# Patient Record
Sex: FEMALE | Race: WHITE | NOT HISPANIC OR LATINO | Employment: OTHER | URBAN - METROPOLITAN AREA
[De-identification: names, ages, dates, MRNs, and addresses within clinical notes are randomized per-mention and may not be internally consistent; named-entity substitution may affect disease eponyms.]

---

## 2023-07-07 LAB
ANION GAP IN SER/PLAS: 11 MMOL/L (ref 10–20)
CALCIUM (MG/DL) IN SER/PLAS: 9.3 MG/DL (ref 8.6–10.3)
CARBON DIOXIDE, TOTAL (MMOL/L) IN SER/PLAS: 27 MMOL/L (ref 21–32)
CHLORIDE (MMOL/L) IN SER/PLAS: 106 MMOL/L (ref 98–107)
CREATININE (MG/DL) IN SER/PLAS: 0.79 MG/DL (ref 0.5–1.05)
ERYTHROCYTE DISTRIBUTION WIDTH (RATIO) BY AUTOMATED COUNT: 14.1 % (ref 11.5–14.5)
ERYTHROCYTE MEAN CORPUSCULAR HEMOGLOBIN CONCENTRATION (G/DL) BY AUTOMATED: 31.5 G/DL (ref 32–36)
ERYTHROCYTE MEAN CORPUSCULAR VOLUME (FL) BY AUTOMATED COUNT: 94 FL (ref 80–100)
ERYTHROCYTES (10*6/UL) IN BLOOD BY AUTOMATED COUNT: 4.49 X10E12/L (ref 4–5.2)
GFR FEMALE: 78 ML/MIN/1.73M2
GLUCOSE (MG/DL) IN SER/PLAS: 101 MG/DL (ref 74–99)
HEMATOCRIT (%) IN BLOOD BY AUTOMATED COUNT: 42.2 % (ref 36–46)
HEMOGLOBIN (G/DL) IN BLOOD: 13.3 G/DL (ref 12–16)
INR IN PPP BY COAGULATION ASSAY: 1 (ref 0.9–1.1)
LEUKOCYTES (10*3/UL) IN BLOOD BY AUTOMATED COUNT: 5 X10E9/L (ref 4.4–11.3)
NRBC (PER 100 WBCS) BY AUTOMATED COUNT: 0 /100 WBC (ref 0–0)
PLATELETS (10*3/UL) IN BLOOD AUTOMATED COUNT: 305 X10E9/L (ref 150–450)
POTASSIUM (MMOL/L) IN SER/PLAS: 4 MMOL/L (ref 3.5–5.3)
PROTHROMBIN TIME (PT) IN PPP BY COAGULATION ASSAY: 11.4 SEC (ref 9.8–12.8)
SODIUM (MMOL/L) IN SER/PLAS: 140 MMOL/L (ref 136–145)
UREA NITROGEN (MG/DL) IN SER/PLAS: 14 MG/DL (ref 6–23)

## 2023-07-25 ENCOUNTER — HOSPITAL ENCOUNTER (OUTPATIENT)
Dept: DATA CONVERSION | Facility: HOSPITAL | Age: 78
End: 2023-07-25
Attending: NEUROLOGICAL SURGERY | Admitting: NEUROLOGICAL SURGERY
Payer: MEDICARE

## 2023-07-25 DIAGNOSIS — I65.22 OCCLUSION AND STENOSIS OF LEFT CAROTID ARTERY: ICD-10-CM

## 2023-07-25 DIAGNOSIS — I67.1 CEREBRAL ANEURYSM, NONRUPTURED (HHS-HCC): ICD-10-CM

## 2023-08-11 LAB
ANION GAP IN SER/PLAS: 10 MMOL/L (ref 10–20)
CALCIUM (MG/DL) IN SER/PLAS: 9.4 MG/DL (ref 8.6–10.3)
CARBON DIOXIDE, TOTAL (MMOL/L) IN SER/PLAS: 29 MMOL/L (ref 21–32)
CHLORIDE (MMOL/L) IN SER/PLAS: 106 MMOL/L (ref 98–107)
CHOLESTEROL (MG/DL) IN SER/PLAS: 169 MG/DL (ref 0–199)
CHOLESTEROL IN HDL (MG/DL) IN SER/PLAS: 75.7 MG/DL
CHOLESTEROL/HDL RATIO: 2.2
CREATININE (MG/DL) IN SER/PLAS: 0.83 MG/DL (ref 0.5–1.05)
ERYTHROCYTE DISTRIBUTION WIDTH (RATIO) BY AUTOMATED COUNT: 14.3 % (ref 11.5–14.5)
ERYTHROCYTE MEAN CORPUSCULAR HEMOGLOBIN CONCENTRATION (G/DL) BY AUTOMATED: 31.5 G/DL (ref 32–36)
ERYTHROCYTE MEAN CORPUSCULAR VOLUME (FL) BY AUTOMATED COUNT: 93 FL (ref 80–100)
ERYTHROCYTES (10*6/UL) IN BLOOD BY AUTOMATED COUNT: 4.52 X10E12/L (ref 4–5.2)
GFR FEMALE: 73 ML/MIN/1.73M2
GLUCOSE (MG/DL) IN SER/PLAS: 93 MG/DL (ref 74–99)
HEMATOCRIT (%) IN BLOOD BY AUTOMATED COUNT: 41.9 % (ref 36–46)
HEMOGLOBIN (G/DL) IN BLOOD: 13.2 G/DL (ref 12–16)
LDL: 76 MG/DL (ref 0–99)
LEUKOCYTES (10*3/UL) IN BLOOD BY AUTOMATED COUNT: 5.1 X10E9/L (ref 4.4–11.3)
NRBC (PER 100 WBCS) BY AUTOMATED COUNT: 0 /100 WBC (ref 0–0)
PLATELETS (10*3/UL) IN BLOOD AUTOMATED COUNT: 374 X10E9/L (ref 150–450)
POTASSIUM (MMOL/L) IN SER/PLAS: 4.1 MMOL/L (ref 3.5–5.3)
SODIUM (MMOL/L) IN SER/PLAS: 141 MMOL/L (ref 136–145)
THYROTROPIN (MIU/L) IN SER/PLAS BY DETECTION LIMIT <= 0.05 MIU/L: 2.12 MIU/L (ref 0.44–3.98)
TRIGLYCERIDE (MG/DL) IN SER/PLAS: 86 MG/DL (ref 0–149)
UREA NITROGEN (MG/DL) IN SER/PLAS: 15 MG/DL (ref 6–23)
VLDL: 17 MG/DL (ref 0–40)

## 2023-09-29 VITALS — WEIGHT: 146.16 LBS | HEIGHT: 62 IN | BODY MASS INDEX: 26.9 KG/M2

## 2023-10-03 DIAGNOSIS — Z12.11 COLON CANCER SCREENING: ICD-10-CM

## 2023-10-05 RX ORDER — LACTULOSE 10 G/15ML
90 SOLUTION ORAL SEE ADMIN INSTRUCTIONS
Qty: 180 ML | Refills: 0 | Status: SHIPPED | OUTPATIENT
Start: 2023-10-05 | End: 2023-10-09 | Stop reason: WASHOUT

## 2023-10-08 PROBLEM — N95.2 ATROPHIC VAGINITIS: Status: ACTIVE | Noted: 2023-10-08
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
## 2023-10-08 PROBLEM — K62.5 RECTAL BLEEDING: Status: ACTIVE | Noted: 2023-10-08

## 2023-10-08 PROBLEM — I67.1 CEREBRAL ANEURYSM (HHS-HCC): Status: ACTIVE | Noted: 2023-10-08

## 2023-10-08 PROBLEM — R52 PAIN: Status: ACTIVE | Noted: 2023-10-08

## 2023-10-08 PROBLEM — K29.80 DUODENITIS: Status: ACTIVE | Noted: 2023-10-08

## 2023-10-08 PROBLEM — N76.4 LABIAL ABSCESS: Status: ACTIVE | Noted: 2023-10-08

## 2023-10-08 PROBLEM — K57.90 DIVERTICULOSIS: Status: ACTIVE | Noted: 2023-10-08

## 2023-10-08 PROBLEM — M54.10 RADICULOPATHY: Status: ACTIVE | Noted: 2023-10-08

## 2023-10-08 PROBLEM — R19.00 PULSATILE ABDOMINAL MASS: Status: ACTIVE | Noted: 2023-10-08

## 2023-10-08 PROBLEM — M85.80 OSTEOPENIA: Status: ACTIVE | Noted: 2023-10-08

## 2023-10-08 PROBLEM — D50.9 HYPOCHROMIC MICROCYTIC ANEMIA: Status: ACTIVE | Noted: 2023-10-08

## 2023-10-08 PROBLEM — S86.911A: Status: ACTIVE | Noted: 2023-10-08

## 2023-10-08 PROBLEM — E07.9 THYROID DISORDER: Status: ACTIVE | Noted: 2023-10-08

## 2023-10-08 PROBLEM — I72.9 ANEURYSM (CMS-HCC): Status: ACTIVE | Noted: 2023-10-08

## 2023-10-08 RX ORDER — LEVOTHYROXINE SODIUM 88 UG/1
88 TABLET ORAL
COMMUNITY

## 2023-10-08 RX ORDER — CHLORHEXIDINE GLUCONATE ORAL RINSE 1.2 MG/ML
15 SOLUTION DENTAL 2 TIMES DAILY
COMMUNITY
Start: 2023-01-12 | End: 2023-10-09 | Stop reason: WASHOUT

## 2023-10-08 RX ORDER — CLOPIDOGREL BISULFATE 75 MG/1
75 TABLET ORAL DAILY
COMMUNITY
Start: 2023-07-06 | End: 2023-10-09 | Stop reason: WASHOUT

## 2023-10-08 RX ORDER — ACETAMINOPHEN 500 MG
1000 TABLET ORAL AS NEEDED
COMMUNITY
Start: 2021-05-25 | End: 2023-10-09 | Stop reason: WASHOUT

## 2023-10-08 RX ORDER — CEFADROXIL 500 MG/1
1 CAPSULE ORAL 2 TIMES DAILY
COMMUNITY
Start: 2021-09-25 | End: 2023-10-09 | Stop reason: WASHOUT

## 2023-10-08 RX ORDER — MOXIFLOXACIN 5 MG/ML
1 SOLUTION/ DROPS OPHTHALMIC 4 TIMES DAILY
COMMUNITY
Start: 2022-11-16 | End: 2023-10-09 | Stop reason: WASHOUT

## 2023-10-08 RX ORDER — NAPROXEN SODIUM 220 MG/1
81 TABLET, FILM COATED ORAL DAILY
COMMUNITY
Start: 2023-07-06

## 2023-10-08 RX ORDER — PREDNISOLONE ACETATE 10 MG/ML
1 SUSPENSION/ DROPS OPHTHALMIC 4 TIMES DAILY
COMMUNITY
Start: 2022-11-16 | End: 2023-10-09 | Stop reason: WASHOUT

## 2023-10-08 RX ORDER — AMLODIPINE BESYLATE 5 MG/1
5 TABLET ORAL DAILY
COMMUNITY

## 2023-10-08 RX ORDER — ATORVASTATIN CALCIUM 20 MG/1
20 TABLET, FILM COATED ORAL DAILY
COMMUNITY

## 2023-10-10 ENCOUNTER — ANESTHESIA EVENT (OUTPATIENT)
Dept: GASTROENTEROLOGY | Facility: HOSPITAL | Age: 78
End: 2023-10-10
Payer: MEDICARE

## 2023-10-10 ENCOUNTER — HOSPITAL ENCOUNTER (OUTPATIENT)
Dept: GASTROENTEROLOGY | Facility: HOSPITAL | Age: 78
Setting detail: OUTPATIENT SURGERY
Discharge: HOME | End: 2023-10-10
Payer: MEDICARE

## 2023-10-10 ENCOUNTER — ANESTHESIA (OUTPATIENT)
Dept: GASTROENTEROLOGY | Facility: HOSPITAL | Age: 78
End: 2023-10-10
Payer: MEDICARE

## 2023-10-10 VITALS
WEIGHT: 146.16 LBS | DIASTOLIC BLOOD PRESSURE: 76 MMHG | TEMPERATURE: 98.6 F | OXYGEN SATURATION: 100 % | HEART RATE: 68 BPM | BODY MASS INDEX: 26.9 KG/M2 | SYSTOLIC BLOOD PRESSURE: 138 MMHG | HEIGHT: 62 IN | RESPIRATION RATE: 16 BRPM

## 2023-10-10 DIAGNOSIS — Z12.11 ENCOUNTER FOR SCREENING FOR MALIGNANT NEOPLASM OF COLON: Primary | ICD-10-CM

## 2023-10-10 PROCEDURE — 88305 TISSUE EXAM BY PATHOLOGIST: CPT | Mod: TC | Performed by: INTERNAL MEDICINE

## 2023-10-10 PROCEDURE — 7100000009 HC PHASE TWO TIME - INITIAL BASE CHARGE

## 2023-10-10 PROCEDURE — 3700000001 HC GENERAL ANESTHESIA TIME - INITIAL BASE CHARGE

## 2023-10-10 PROCEDURE — 88305 TISSUE EXAM BY PATHOLOGIST: CPT | Mod: TC,SUR | Performed by: INTERNAL MEDICINE

## 2023-10-10 PROCEDURE — 7100000010 HC PHASE TWO TIME - EACH INCREMENTAL 1 MINUTE

## 2023-10-10 PROCEDURE — 2500000004 HC RX 250 GENERAL PHARMACY W/ HCPCS (ALT 636 FOR OP/ED): Performed by: ANESTHESIOLOGIST ASSISTANT

## 2023-10-10 PROCEDURE — 3700000002 HC GENERAL ANESTHESIA TIME - EACH INCREMENTAL 1 MINUTE

## 2023-10-10 PROCEDURE — A45380 PR COLONOSCOPY,BIOPSY: Performed by: ANESTHESIOLOGIST ASSISTANT

## 2023-10-10 PROCEDURE — A45380 PR COLONOSCOPY,BIOPSY: Performed by: ANESTHESIOLOGY

## 2023-10-10 PROCEDURE — 2500000005 HC RX 250 GENERAL PHARMACY W/O HCPCS: Performed by: ANESTHESIOLOGIST ASSISTANT

## 2023-10-10 PROCEDURE — 99100 ANES PT EXTEME AGE<1 YR&>70: CPT | Performed by: ANESTHESIOLOGY

## 2023-10-10 PROCEDURE — 88305 TISSUE EXAM BY PATHOLOGIST: CPT | Performed by: PATHOLOGY

## 2023-10-10 PROCEDURE — 45380 COLONOSCOPY AND BIOPSY: CPT | Performed by: INTERNAL MEDICINE

## 2023-10-10 PROCEDURE — 2580000001 HC RX 258 IV SOLUTIONS: Performed by: ANESTHESIOLOGIST ASSISTANT

## 2023-10-10 RX ORDER — PROPOFOL 10 MG/ML
INJECTION, EMULSION INTRAVENOUS CONTINUOUS PRN
Status: DISCONTINUED | OUTPATIENT
Start: 2023-10-10 | End: 2023-10-10

## 2023-10-10 RX ORDER — SODIUM CHLORIDE, SODIUM LACTATE, POTASSIUM CHLORIDE, CALCIUM CHLORIDE 600; 310; 30; 20 MG/100ML; MG/100ML; MG/100ML; MG/100ML
20 INJECTION, SOLUTION INTRAVENOUS CONTINUOUS
Status: DISCONTINUED | OUTPATIENT
Start: 2023-10-10 | End: 2023-10-18

## 2023-10-10 RX ORDER — LIDOCAINE HYDROCHLORIDE 20 MG/ML
INJECTION, SOLUTION INFILTRATION; PERINEURAL AS NEEDED
Status: DISCONTINUED | OUTPATIENT
Start: 2023-10-10 | End: 2023-10-10

## 2023-10-10 RX ORDER — PROPOFOL 10 MG/ML
INJECTION, EMULSION INTRAVENOUS AS NEEDED
Status: DISCONTINUED | OUTPATIENT
Start: 2023-10-10 | End: 2023-10-10

## 2023-10-10 RX ADMIN — PROPOFOL 16 MG: 10 INJECTION, EMULSION INTRAVENOUS at 10:56

## 2023-10-10 RX ADMIN — PROPOFOL 50 MG: 10 INJECTION, EMULSION INTRAVENOUS at 10:27

## 2023-10-10 RX ADMIN — LIDOCAINE HYDROCHLORIDE 35 MG: 20 INJECTION, SOLUTION INFILTRATION; PERINEURAL at 10:27

## 2023-10-10 RX ADMIN — PROPOFOL 20 MG: 10 INJECTION, EMULSION INTRAVENOUS at 10:34

## 2023-10-10 RX ADMIN — PROPOFOL 100 MCG/KG/MIN: 10 INJECTION, EMULSION INTRAVENOUS at 10:27

## 2023-10-10 RX ADMIN — SODIUM CHLORIDE, POTASSIUM CHLORIDE, SODIUM LACTATE AND CALCIUM CHLORIDE: 600; 310; 30; 20 INJECTION, SOLUTION INTRAVENOUS at 10:23

## 2023-10-10 SDOH — HEALTH STABILITY: MENTAL HEALTH: CURRENT SMOKER: 0

## 2023-10-10 ASSESSMENT — PAIN SCALES - GENERAL
PAIN_LEVEL: 0
PAINLEVEL_OUTOF10: 0 - NO PAIN

## 2023-10-10 ASSESSMENT — PAIN - FUNCTIONAL ASSESSMENT: PAIN_FUNCTIONAL_ASSESSMENT: 0-10

## 2023-10-10 NOTE — DISCHARGE INSTRUCTIONS
Patient Instructions after a Colonoscopy      The anesthetics, sedatives or narcotics which were given to you today will be acting in your body for the next 24 hours, so you might feel a little sleepy or groggy.  This feeling should slowly wear off. Carefully read and follow the instructions.     You received sedation today:  - Do not drive or operate any machinery or power tools of any kind.   - No alcoholic beverages today, not even beer or wine.  - Do not make any important decisions or sign any legal documents.  - No over the counter medications that contain alcohol or that may cause drowsiness.  - Do not make any important decisions or sign any legal documents.    While it is common to experience mild to moderate abdominal distention, gas, or belching after your procedure, if any of these symptoms occur following discharge from the GI Lab or within one week of having your procedure, call the [facility] to be advised whether a visit to your nearest Urgent Care or Emergency Department is indicated.  Take this paper with you if you go.     - If you develop an allergic reaction to the medications that were given during your procedure such as difficulty breathing, rash, hives, severe nausea, vomiting or lightheadedness.  - If you experience chest pain, shortness of breath, severe abdominal pain, fevers and chills.  -If you develop signs and symptoms of bleeding such as blood in your spit, if your stools turn black, tarry, or bloody  - If you have not urinated within 8 hours following your procedure.  - If your IV site becomes painful, red, inflamed, or looks infected.    If you received a biopsy/polypectomy/sphincterotomy the following instructions apply below:    __ Do not use Aspirin containing products, non-steroidal medications or anti-coagulants for one week following your procedure. (Examples of these types of medications are: Advil, Arthrotec, Aleve, Coumadin, Ecotrin, Heparin, Ibuprofen,  Indocin, Motrin, Naprosyn, Nuprin, Plavix, Vioxx, and Voltarin, or their generic forms.  This list is not all-inclusive.  Check with your physician or pharmacist before resuming medications.)   __ Eat a soft diet today.  Avoid foods that are poorly digested for the next 24 hours.  These foods would include: nuts, beans, lettuce, red meats, and fried foods. Start with liquids and advance your diet as tolerated, gradually work up to eating solids.   __ Do not have a Barium Study or Enema for one week.    Your physician recommends the additional following instructions:    -You have a contact number available for emergencies. The signs and symptoms of potential delayed complications were discussed with you. You may return to normal activities tomorrow.  -Resume your previous diet.  -Continue your present medications.   -We are waiting for your pathology results.  -Your physician has recommended a repeat colonoscopy (date to be determined after pending pathology results are reviewed) for surveillance based on pathology results.  -The findings and recommendations have been discussed with you.  -The findings and recommendations were discussed with your family.  - Please see Medication Reconciliation Form for new medication/medications prescribed.       If you experience any problems or have any questions following discharge from the GI Lab, please call:        Nurse Signature                                                                        Date___________________                                                                            Patient/Responsible Party Signature                                        Date___________________

## 2023-10-10 NOTE — ANESTHESIA POSTPROCEDURE EVALUATION
Patient: Sherin Hancock    Procedure Summary       Date: 10/10/23 Room / Location: [facility]    Anesthesia Start: 1018 Anesthesia Stop: 1107    Procedure: COLONOSCOPY Diagnosis:       Encounter for screening for malignant neoplasm of colon      Encounter for screening for malignant neoplasm of colon    Scheduled Providers: Gonzalez Montilla MD Responsible Provider: Wm Crawford MD    Anesthesia Type: MAC ASA Status: 3            Anesthesia Type: MAC    Vitals Value Taken Time   /61 10/10/23 1111   Temp 37.0 10/10/23 1111   Pulse 65 10/10/23 1111   Resp 16 10/10/23 1111   SpO2 100 10/10/23 1111       Anesthesia Post Evaluation    Patient location during evaluation: PACU  Patient participation: complete - patient participated  Level of consciousness: awake  Pain score: 0  Pain management: adequate  Airway patency: patent  Cardiovascular status: acceptable  Respiratory status: acceptable  Hydration status: acceptable      No notable events documented.

## 2023-10-10 NOTE — ANESTHESIA PREPROCEDURE EVALUATION
Patient: Sherin Hancock    Procedure Information       Date/Time: 10/10/23 1000    Scheduled providers: Gonzalez Montilla MD    Procedure: COLONOSCOPY    Location: [facility]            Relevant Problems   Cardiovascular   (+) Cerebral aneurysm      GI   (+) Rectal bleeding      Neuro/Psych   (+) Cerebral aneurysm   (+) Radiculopathy      Hematology   (+) Hypochromic microcytic anemia       Clinical information reviewed:   Tobacco  Allergies  Meds   Med Hx  Surg Hx   Fam Hx          NPO Detail:  NPO/Void Status  Date of Last Liquid: 10/09/23  Time of Last Liquid: 2200  Date of Last Solid: 10/08/23  Time of Last Solid: 1800  Last Intake Type: Clear fluids         Physical Exam    Airway  Mallampati: II  TM distance: >3 FB  Neck ROM: full     Cardiovascular - normal exam  Rhythm: regular  Rate: normal     Dental    Pulmonary - normal exam     Abdominal            Anesthesia Plan    ASA 3     MAC     The patient is not a current smoker.    intravenous induction   Anesthetic plan and risks discussed with patient.  Use of blood products discussed with patient who.    Plan discussed with CAA.

## 2023-10-10 NOTE — H&P
"History Of Present Illness  Sherin Hancock is a 78 y.o. female presenting with rectal bleeding she also has history of cerebral aneurysm she has been cleared for the procedure by her neurosurgeon.  Pathology.     Past Medical History  Past Medical History:   Diagnosis Date    Encounter for screening for malignant neoplasm of vagina     Vaginal Pap smear    Hyperlipidemia     Hypertension     Hypothyroidism     Personal history of diseases of the skin and subcutaneous tissue     History of sebaceous cyst    Personal history of other diseases of the female genital tract     History of vaginal discharge    Personal history of other medical treatment     H/O mammogram    Personal history of other medical treatment     H/O bone density study       Surgical History  Past Surgical History:   Procedure Laterality Date    BREAST LUMPECTOMY  07/17/2015    Left Breast Lumpectomy    TUBAL LIGATION  07/17/2015    Tubal Ligation        Social History  She reports that she has never smoked. She has never used smokeless tobacco. She reports that she does not drink alcohol and does not use drugs.    Family History  Family History   Problem Relation Name Age of Onset    Hypertension Mother      Cancer Mother      Hypertension Father      Cancer Father          Allergies  Patient has no known allergies.    Review of Systems     Physical Exam     Last Recorded Vitals  Pulse 82, temperature 36.3 °C (97.3 °F), temperature source Temporal, resp. rate 16, height 1.575 m (5' 2\"), weight 66.3 kg (146 lb 2.6 oz), SpO2 98 %.    Relevant Results             Assessment/Plan   Active Problems:  There are no active Hospital Problems.      This lady has recurrent rectal bleeding and requires colonoscopy evaluation rule out carcinoma.  She also history of cerebral aneurysm and which has been treated by surgery and clearance was obtained from neurosurgery.       I spent  minutes in the professional and overall care of this patient.      Gonzalez POPE " MD [place]

## 2023-10-10 NOTE — POST-PROCEDURE NOTE
1210 Discharge instruction packet & anesthesia discharge information folder reviewed & given to pt

## 2023-10-17 LAB
LABORATORY COMMENT REPORT: NORMAL
PATH REPORT.COMMENTS IMP SPEC: NORMAL
PATH REPORT.FINAL DX SPEC: NORMAL
PATH REPORT.GROSS SPEC: NORMAL
PATH REPORT.TOTAL CANCER: NORMAL

## 2023-10-26 ENCOUNTER — OFFICE VISIT (OUTPATIENT)
Dept: GASTROENTEROLOGY | Facility: CLINIC | Age: 78
End: 2023-10-26
Payer: MEDICARE

## 2023-10-26 VITALS
HEART RATE: 70 BPM | BODY MASS INDEX: 26.87 KG/M2 | DIASTOLIC BLOOD PRESSURE: 84 MMHG | SYSTOLIC BLOOD PRESSURE: 135 MMHG | WEIGHT: 146 LBS | HEIGHT: 62 IN

## 2023-10-26 DIAGNOSIS — K51.911 ULCERATIVE COLITIS WITH RECTAL BLEEDING, UNSPECIFIED LOCATION (MULTI): Primary | ICD-10-CM

## 2023-10-26 PROCEDURE — 1159F MED LIST DOCD IN RCRD: CPT | Performed by: INTERNAL MEDICINE

## 2023-10-26 PROCEDURE — 99213 OFFICE O/P EST LOW 20 MIN: CPT | Performed by: INTERNAL MEDICINE

## 2023-10-26 PROCEDURE — 1036F TOBACCO NON-USER: CPT | Performed by: INTERNAL MEDICINE

## 2023-10-26 PROCEDURE — 1126F AMNT PAIN NOTED NONE PRSNT: CPT | Performed by: INTERNAL MEDICINE

## 2023-10-26 RX ORDER — MESALAMINE 4 G/60ML
4 SUSPENSION RECTAL NIGHTLY
Qty: 1260 ML | Refills: 0 | Status: SHIPPED | OUTPATIENT
Start: 2023-10-26 | End: 2023-11-16

## 2023-10-26 NOTE — PROGRESS NOTES
Subjective   Patient ID: Sherin Hancock is a 78 y.o. female who presents for Follow-up (After colonoscopy ).  HPI  This patient had a colonoscopy because of recurrent rectal bleeding and which showed inflammatory bowel disease in the left side of the colon confirmed on biopsies.  She continues to have only 1 or 2 bowel movement per day but there is blood present in the stool.  She has no systemic symptoms fever abdominal pain arthralgias.  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  She also history of hypothyroid thyroidism hypertension and hyperlipidemia.    Review of Systems  She has no cardiac pulmonary or neurological problems her cerebral aneurysm is very stable.    Objective   HEENT unremarkable heart sounds were normal chest is clear abdominal exam was completely unremarkable minimal tenderness left lower quadrant good bowel sounds were heard.  Extremities unremarkable mental status neuro normal.  Physical Exam    Assessment/Plan   This patient's colonoscopy showed evidence of active inflammatory bowel diseas  I believe she will benefit by a course of mesalamine retention enemas.

## 2023-12-14 ENCOUNTER — APPOINTMENT (OUTPATIENT)
Dept: GASTROENTEROLOGY | Facility: CLINIC | Age: 78
End: 2023-12-14
Payer: MEDICARE

## 2023-12-20 DIAGNOSIS — I67.1 CEREBRAL ANEURYSM (HHS-HCC): Primary | ICD-10-CM

## 2023-12-20 DIAGNOSIS — Z86.79 HISTORY OF CEREBRAL ANEURYSM: ICD-10-CM

## 2024-01-11 ENCOUNTER — OFFICE VISIT (OUTPATIENT)
Dept: GASTROENTEROLOGY | Facility: CLINIC | Age: 79
End: 2024-01-11
Payer: MEDICARE

## 2024-01-11 ENCOUNTER — LAB (OUTPATIENT)
Dept: LAB | Facility: LAB | Age: 79
End: 2024-01-11
Payer: MEDICARE

## 2024-01-11 VITALS
BODY MASS INDEX: 26.87 KG/M2 | HEIGHT: 62 IN | SYSTOLIC BLOOD PRESSURE: 137 MMHG | DIASTOLIC BLOOD PRESSURE: 79 MMHG | WEIGHT: 146 LBS | HEART RATE: 72 BPM

## 2024-01-11 DIAGNOSIS — K51.30 ULCERATIVE RECTOSIGMOIDITIS WITHOUT COMPLICATION (MULTI): Primary | ICD-10-CM

## 2024-01-11 DIAGNOSIS — Z86.79 HISTORY OF CEREBRAL ANEURYSM: ICD-10-CM

## 2024-01-11 DIAGNOSIS — I67.1 CEREBRAL ANEURYSM (HHS-HCC): ICD-10-CM

## 2024-01-11 LAB
ANION GAP SERPL CALC-SCNC: 12 MMOL/L (ref 10–20)
BUN SERPL-MCNC: 13 MG/DL (ref 6–23)
CALCIUM SERPL-MCNC: 9.4 MG/DL (ref 8.6–10.3)
CHLORIDE SERPL-SCNC: 107 MMOL/L (ref 98–107)
CO2 SERPL-SCNC: 25 MMOL/L (ref 21–32)
CREAT SERPL-MCNC: 0.65 MG/DL (ref 0.5–1.05)
EGFRCR SERPLBLD CKD-EPI 2021: >90 ML/MIN/1.73M*2
ERYTHROCYTE [DISTWIDTH] IN BLOOD BY AUTOMATED COUNT: 14.6 % (ref 11.5–14.5)
GLUCOSE SERPL-MCNC: 86 MG/DL (ref 74–99)
HCT VFR BLD AUTO: 42 % (ref 36–46)
HGB BLD-MCNC: 13.4 G/DL (ref 12–16)
INR PPP: 1 (ref 0.9–1.1)
MCH RBC QN AUTO: 29.5 PG (ref 26–34)
MCHC RBC AUTO-ENTMCNC: 31.9 G/DL (ref 32–36)
MCV RBC AUTO: 92 FL (ref 80–100)
NRBC BLD-RTO: 0 /100 WBCS (ref 0–0)
PLATELET # BLD AUTO: 307 X10*3/UL (ref 150–450)
POTASSIUM SERPL-SCNC: 4.3 MMOL/L (ref 3.5–5.3)
PROTHROMBIN TIME: 11.5 SECONDS (ref 9.8–12.8)
RBC # BLD AUTO: 4.55 X10*6/UL (ref 4–5.2)
SODIUM SERPL-SCNC: 140 MMOL/L (ref 136–145)
WBC # BLD AUTO: 5.3 X10*3/UL (ref 4.4–11.3)

## 2024-01-11 PROCEDURE — 1036F TOBACCO NON-USER: CPT | Performed by: INTERNAL MEDICINE

## 2024-01-11 PROCEDURE — 80048 BASIC METABOLIC PNL TOTAL CA: CPT

## 2024-01-11 PROCEDURE — 85027 COMPLETE CBC AUTOMATED: CPT

## 2024-01-11 PROCEDURE — 1159F MED LIST DOCD IN RCRD: CPT | Performed by: INTERNAL MEDICINE

## 2024-01-11 PROCEDURE — 1126F AMNT PAIN NOTED NONE PRSNT: CPT | Performed by: INTERNAL MEDICINE

## 2024-01-11 PROCEDURE — 99213 OFFICE O/P EST LOW 20 MIN: CPT | Performed by: INTERNAL MEDICINE

## 2024-01-11 PROCEDURE — 36415 COLL VENOUS BLD VENIPUNCTURE: CPT

## 2024-01-11 PROCEDURE — 85610 PROTHROMBIN TIME: CPT

## 2024-01-11 NOTE — PROGRESS NOTES
This lady was having intermittent rectal bleeding and cramps her colonoscopy confirmed the presence of a left-sided ulcerative colitis she had treatment with mesalamine retention enema.  She is an excellent response to this and her bowel movements are normal now normal consistency normal bowel movement without any bleeding or cramping and she seem to have gone into remission.    Review of system is positive for cerebral aneurysm which has been treated with coils she is going to have another angiogram in about a week to check its stability.    Her overall health otherwise remains good.    She has no extraintestinal symptoms of inflammatory bowel disease.  No skin lesions no fever appetite is good bowel movements are normal.    Her physical examination show vital signs were stable HEENT unremarkable carotid 2+ JVD is flat thyroid is not enlarged heart sounds were normal regular sinus rhythm chest is clear to auscultation and percussion examination abdomen was completely normal no peripheral edema mental status neuro grossly normal.  Clinical impression left-sided ulcerative colitis with excellent response to topical mesalamine therapy.    I discussed with her the pros and cons of instituting long-term therapy with mesalamine mesalamine and after detailed discussion it was decided to not to place on long-term therapy unless she has any further recurrence or exacerbation in which case we can maintain her on long-term mesalamine therapy.

## 2024-01-23 ENCOUNTER — HOSPITAL ENCOUNTER (OUTPATIENT)
Dept: RADIOLOGY | Facility: HOSPITAL | Age: 79
Discharge: HOME | End: 2024-01-23
Payer: MEDICARE

## 2024-01-23 VITALS
TEMPERATURE: 98.4 F | OXYGEN SATURATION: 97 % | DIASTOLIC BLOOD PRESSURE: 65 MMHG | HEART RATE: 77 BPM | RESPIRATION RATE: 16 BRPM | SYSTOLIC BLOOD PRESSURE: 116 MMHG

## 2024-01-23 DIAGNOSIS — I67.1 CEREBRAL ANEURYSM (HHS-HCC): ICD-10-CM

## 2024-01-23 PROCEDURE — C1769 GUIDE WIRE: HCPCS

## 2024-01-23 PROCEDURE — C1894 INTRO/SHEATH, NON-LASER: HCPCS

## 2024-01-23 PROCEDURE — C1760 CLOSURE DEV, VASC: HCPCS

## 2024-01-23 PROCEDURE — 99152 MOD SED SAME PHYS/QHP 5/>YRS: CPT | Performed by: NEUROLOGICAL SURGERY

## 2024-01-23 PROCEDURE — 36224 PLACE CATH CAROTD ART: CPT | Performed by: NEUROLOGICAL SURGERY

## 2024-01-23 PROCEDURE — 2500000004 HC RX 250 GENERAL PHARMACY W/ HCPCS (ALT 636 FOR OP/ED): Performed by: NEUROLOGICAL SURGERY

## 2024-01-23 PROCEDURE — 99153 MOD SED SAME PHYS/QHP EA: CPT

## 2024-01-23 PROCEDURE — 75710 ARTERY X-RAYS ARM/LEG: CPT | Mod: RT | Performed by: NEUROLOGICAL SURGERY

## 2024-01-23 PROCEDURE — 2550000001 HC RX 255 CONTRASTS: Performed by: NEUROLOGICAL SURGERY

## 2024-01-23 PROCEDURE — 99152 MOD SED SAME PHYS/QHP 5/>YRS: CPT

## 2024-01-23 PROCEDURE — 2780000003 HC OR 278 NO HCPCS

## 2024-01-23 PROCEDURE — 2720000007 HC OR 272 NO HCPCS

## 2024-01-23 RX ORDER — FENTANYL CITRATE 50 UG/ML
INJECTION, SOLUTION INTRAMUSCULAR; INTRAVENOUS
Status: COMPLETED | OUTPATIENT
Start: 2024-01-23 | End: 2024-01-23

## 2024-01-23 RX ORDER — MIDAZOLAM HYDROCHLORIDE 1 MG/ML
INJECTION INTRAMUSCULAR; INTRAVENOUS
Status: COMPLETED | OUTPATIENT
Start: 2024-01-23 | End: 2024-01-23

## 2024-01-23 RX ADMIN — FENTANYL CITRATE 50 MCG: 50 INJECTION, SOLUTION INTRAMUSCULAR; INTRAVENOUS at 08:10

## 2024-01-23 RX ADMIN — MIDAZOLAM HYDROCHLORIDE 1 MG: 1 INJECTION, SOLUTION INTRAMUSCULAR; INTRAVENOUS at 08:10

## 2024-01-23 RX ADMIN — IOHEXOL 100 ML: 350 INJECTION, SOLUTION INTRAVENOUS at 08:37

## 2024-01-23 ASSESSMENT — PAIN SCALES - GENERAL

## 2024-01-23 ASSESSMENT — PAIN - FUNCTIONAL ASSESSMENT
PAIN_FUNCTIONAL_ASSESSMENT: 0-10

## 2024-01-23 NOTE — PROCEDURES
Pre-Procedure Verification and Time Out:  Procedure Location: procedure area  HUDDLE - Pre-procedure Verification:  completed  TIME OUT - Final Verification:  completed immediately prior to procedure start  DEBRIEF: completed    Complications:  None; patient tolerated the procedure well.     Disposition: rPCU  Condition: stable  Specimens Collected: No specimens collected    General Information:   Anesthesia/ sedation: Non-Anesthesia  Indication(s)/Pre - Procedure Diagnoses: cerebral aneurysm  Post-Procedure Diagnosis: same  Procedure Name: Diagnostic Cerebral Angiogram  Procedure performed by: Dr. Zaynab Kate  Assistant(s): Lukasz  Estimated Blood Loss (mL): 15  Specimen: no  Informed Consent: consent obtained and in chart    Access: 5 Fr Sheath in R CFA  Closure: Mynx  Vessels Injected: LICA, R CFA  Moderate Sedation Time: 15 minutes  Findings: LICA cavernous segment aneurysm with stable coil mass and pipeline embolization device, partial residual filling with contrast stagnation, no in-stent stenosis or endoleak. Full report to follow in PACS dictation

## 2024-01-23 NOTE — DISCHARGE INSTRUCTIONS
Refer to mynx closure device pamphlet and the angiogram patient informations sheet for further discharge instructions.

## 2024-01-23 NOTE — PRE-PROCEDURE NOTE
Pre-Procedure H&P     Provider Assessment:  Diagnosis/Reason for Procedure: cerebral aneurysm  Procedure: Diagnostic Cerebral Angiogram  Medications Reviewed:   yes   Prophylatic Antibiotics Needed:   no    Neuro status: A&Ox3, moving all extremities full strength   Mouth Opening OK: yes   Neck Flexibility OK: yes   Sedation Plan: moderate sedation   COVID-19 Risk Consent:  Surgeon has reviewed key risks related to the risk of yuliana COVID-19 and if they contract COVID-19 what the risks are.

## 2024-02-02 DIAGNOSIS — I67.1 CEREBRAL ANEURYSM (HHS-HCC): Primary | ICD-10-CM

## 2024-02-02 NOTE — RESULT ENCOUNTER NOTE
I spoke with the patient over the phone and informed the patient of the result.  Recommend MRA without EDDIE in 1 year for cerebral aneurysm

## 2024-05-06 ENCOUNTER — OFFICE VISIT (OUTPATIENT)
Dept: OBSTETRICS AND GYNECOLOGY | Facility: CLINIC | Age: 79
End: 2024-05-06
Payer: MEDICARE

## 2024-05-06 VITALS
HEART RATE: 71 BPM | WEIGHT: 150.4 LBS | BODY MASS INDEX: 27.68 KG/M2 | OXYGEN SATURATION: 98 % | HEIGHT: 62 IN | SYSTOLIC BLOOD PRESSURE: 125 MMHG | DIASTOLIC BLOOD PRESSURE: 65 MMHG

## 2024-05-06 DIAGNOSIS — Z01.419 WELL WOMAN EXAM WITH ROUTINE GYNECOLOGICAL EXAM: Primary | ICD-10-CM

## 2024-05-06 DIAGNOSIS — S30.814A ABRASION OF LABIA, INITIAL ENCOUNTER: ICD-10-CM

## 2024-05-06 DIAGNOSIS — Z12.31 ENCOUNTER FOR SCREENING MAMMOGRAM FOR BREAST CANCER: ICD-10-CM

## 2024-05-06 PROCEDURE — 1036F TOBACCO NON-USER: CPT | Performed by: NURSE PRACTITIONER

## 2024-05-06 PROCEDURE — 99397 PER PM REEVAL EST PAT 65+ YR: CPT | Performed by: NURSE PRACTITIONER

## 2024-05-06 PROCEDURE — 1160F RVW MEDS BY RX/DR IN RCRD: CPT | Performed by: NURSE PRACTITIONER

## 2024-05-06 PROCEDURE — 1159F MED LIST DOCD IN RCRD: CPT | Performed by: NURSE PRACTITIONER

## 2024-05-06 RX ORDER — PREDNISOLONE ACETATE 10 MG/ML
SUSPENSION/ DROPS OPHTHALMIC
COMMUNITY
Start: 2024-04-23

## 2024-05-06 NOTE — PROGRESS NOTES
HPI: Sherin Hancock is a 78 y.o. year old  presenting for annual gyn exam  Past pt of Dr. Frazier, and most recently of Dr Driscoll  , has 3 sons, also caring for her sister in law  Pt is a retired OB nurse    Current concerns: none    LMP:  No LMP recorded. Patient is postmenopausal.; Menopause at 43yo, no HRT  Abnormal bleeding: no  Hot flashes/night sweats: no  Sexually active: no  Vaginal dryness: no   STI concerns: no  Last mammogram: 10/2022 normal   Last pap: normal pap history  History of abnormal pap: no  Other gyn history:  x3, BTL, h/o left breast excisional biopsy- benign   OB History    No obstetric history on file.      Past Medical History:  No date: Encounter for screening for malignant neoplasm of vagina      Comment:  Vaginal Pap smear  No date: Hyperlipidemia  No date: Hypertension  No date: Hypothyroidism  No date: Personal history of diseases of the skin and subcutaneous   tissue      Comment:  History of sebaceous cyst  No date: Personal history of other diseases of the female genital   tract      Comment:  History of vaginal discharge  No date: Personal history of other medical treatment      Comment:  H/O mammogram  No date: Personal history of other medical treatment      Comment:  H/O bone density study   Past Surgical History:  2015: BREAST LUMPECTOMY      Comment:  Left Breast Lumpectomy  2015: TUBAL LIGATION      Comment:  Tubal Ligation   Social History    Socioeconomic History      Marital status:       Spouse name: Not on file      Number of children: Not on file      Years of education: Not on file      Highest education level: Not on file    Occupational History      Not on file    Tobacco Use      Smoking status: Never      Smokeless tobacco: Never    Vaping Use      Vaping status: Never Used    Substance and Sexual Activity      Alcohol use: Never      Drug use: Never      Sexual activity: Not on file    Other Topics      Concerns:        Not on  file    Social History Narrative      Not on file    Social Determinants of Health  Financial Resource Strain: Not on file  Food Insecurity: Not on file  Transportation Needs: Not on file  Physical Activity: Not on file  Stress: Not on file  Social Connections: Not on file  Intimate Partner Violence: Not on file  Housing Stability: Not on file   Review of patient's family history indicates:  Problem: Hypertension      Relation: Mother          Name:               Age of Onset: (Not Specified)  Problem: Cancer      Relation: Mother          Name:               Age of Onset: (Not Specified)  Problem: Hypertension      Relation: Father          Name:               Age of Onset: (Not Specified)  Problem: Other (bladder cancer)      Relation: Father          Name:               Age of Onset: (Not Specified)              Comment: age 64 upon passing  Problem: Breast cancer      Relation: Mother's Sister          Name:               Age of Onset: 40 - 49     Current Outpatient Medications:   amLODIPine (Norvasc) 5 mg tablet, Take 1 tablet (5 mg) by mouth once daily., Disp: , Rfl:   aspirin 81 mg chewable tablet, Chew 1 tablet (81 mg) once daily. CHEW AND SWALLOW. PLEASE START ONE WEEK BEFORE ANGIOGRAM PROCEDURE, Disp: , Rfl:   atorvastatin (Lipitor) 20 mg tablet, Take 1 tablet (20 mg) by mouth once daily., Disp: , Rfl:   prednisoLONE acetate (Pred-Forte) 1 % ophthalmic suspension, INSTILL ONE DROP into the right eye FOUR TIMES A DAY, Disp: , Rfl:   Synthroid 88 mcg tablet, Take 1 tablet (88 mcg) by mouth once daily in the morning. Take before meals., Disp: , Rfl:   mesalamine (Rowasa) 4 gram/60 mL enema, Insert 60 mL (4 g) into the rectum once daily at bedtime for 21 days. Use as directed, Disp: 1260 mL, Rfl: 0          REVIEW OF SYSTEMS:  Breast. denies masses, nipple discharge  : denies dysuria, hematuria incontinence   Gl: denies change in bowel habits, blood in stools; has had ulcerative colitis for years, recently  "has had       PHYSICAL EXAM:  Vitals: /65   Pulse 71   Ht 1.575 m (5' 2\")   Wt 68.2 kg (150 lb 6.4 oz)   SpO2 98%   BMI 27.51 kg/m²   Gen: well appearing woman in NAD  HEENT: normocephalic, thyroid not enlarged, no lymphadenopathy  CV: no m/r/g  Chest: CTAB, no w/r/r  Breast: normal tissue bilaterally without masses, skin changes, lymphadenopathy   Abdomen: soft, nontender, no masses/hernias, liver and spleen not enlarged   Pelvic:  - external genitalia: normal  - vagina: normal; atrophic changes noted, skin abrasion noted just inside left labia majora/opening of vaginal approx 0.5 cm oval, no active bleeding or discharge noted. Pt states only burning sometimes, but otherwise not bothering her.  - cervix: normal  - uterus: normal size, nontender  - adnexa: no palpable masses or tenderness  RECTAL: no external hemorrhoids; rectal exam deferred; up to date on colonoscopy    ASSESSMENT/PLAN :    1) Health maintenance, Well-woman exam.  Pap/HPV up to date. No longer needed   Mammogram ordered  Nutrition, exercise and routine health maintenance exams reviewed.  Calcium/Vitamin D supplementation information provided   Smoking cessation: non-smoker  2) Postmenopausal: no concerns  3) STD screening: declines, not SA  4) small labial abrasion, discussed sitz, loose clothing and coconut oil topical. Pt agrees and will notify if worsening or becomes painful.  Follow up one year or sooner as needed   "

## 2024-05-07 ENCOUNTER — TELEPHONE (OUTPATIENT)
Dept: GASTROENTEROLOGY | Facility: CLINIC | Age: 79
End: 2024-05-07
Payer: MEDICARE

## 2024-05-09 ENCOUNTER — HOSPITAL ENCOUNTER (OUTPATIENT)
Dept: RADIOLOGY | Facility: CLINIC | Age: 79
Discharge: HOME | End: 2024-05-09
Payer: MEDICARE

## 2024-05-09 VITALS — HEIGHT: 62 IN | WEIGHT: 150 LBS | BODY MASS INDEX: 27.6 KG/M2

## 2024-05-09 DIAGNOSIS — Z12.31 ENCOUNTER FOR SCREENING MAMMOGRAM FOR BREAST CANCER: ICD-10-CM

## 2024-05-09 PROCEDURE — 77067 SCR MAMMO BI INCL CAD: CPT

## 2024-05-09 PROCEDURE — 77067 SCR MAMMO BI INCL CAD: CPT | Performed by: RADIOLOGY

## 2024-05-09 PROCEDURE — 77063 BREAST TOMOSYNTHESIS BI: CPT | Performed by: RADIOLOGY

## 2024-05-14 ENCOUNTER — APPOINTMENT (OUTPATIENT)
Dept: RADIOLOGY | Facility: CLINIC | Age: 79
End: 2024-05-14
Payer: MEDICARE

## 2024-05-14 ENCOUNTER — HOSPITAL ENCOUNTER (OUTPATIENT)
Dept: RADIOLOGY | Facility: CLINIC | Age: 79
Discharge: HOME | End: 2024-05-14
Payer: MEDICARE

## 2024-05-14 DIAGNOSIS — R92.8 OTHER ABNORMAL AND INCONCLUSIVE FINDINGS ON DIAGNOSTIC IMAGING OF BREAST: ICD-10-CM

## 2024-05-14 DIAGNOSIS — R92.8 ABNORMAL FINDING ON BREAST IMAGING: ICD-10-CM

## 2024-05-14 PROCEDURE — G0279 TOMOSYNTHESIS, MAMMO: HCPCS | Mod: RIGHT SIDE | Performed by: RADIOLOGY

## 2024-05-14 PROCEDURE — 77065 DX MAMMO INCL CAD UNI: CPT | Mod: RIGHT SIDE | Performed by: RADIOLOGY

## 2024-05-14 PROCEDURE — 77061 BREAST TOMOSYNTHESIS UNI: CPT | Mod: RT

## 2024-05-14 NOTE — PROGRESS NOTES
I had the pleasure of meeting with Sherin and discussing her need for a right breast stereotactic biopsy. I scheduled her with Dr. Pedro on 5-15 and her biopsy on 5-20. I explained the process and procedure. Patient states that she understands. I gave her the folder and my card to call with any further questions.

## 2024-05-15 ENCOUNTER — APPOINTMENT (OUTPATIENT)
Dept: SURGERY | Facility: CLINIC | Age: 79
End: 2024-05-15
Payer: MEDICARE

## 2024-05-15 ENCOUNTER — OFFICE VISIT (OUTPATIENT)
Dept: SURGERY | Facility: CLINIC | Age: 79
End: 2024-05-15
Payer: MEDICARE

## 2024-05-15 VITALS
RESPIRATION RATE: 18 BRPM | HEIGHT: 62 IN | WEIGHT: 150 LBS | TEMPERATURE: 97.8 F | SYSTOLIC BLOOD PRESSURE: 138 MMHG | DIASTOLIC BLOOD PRESSURE: 92 MMHG | HEART RATE: 75 BPM | OXYGEN SATURATION: 97 % | BODY MASS INDEX: 27.6 KG/M2

## 2024-05-15 DIAGNOSIS — R92.8 ABNORMAL FINDING ON BREAST IMAGING: Primary | ICD-10-CM

## 2024-05-15 PROCEDURE — 99203 OFFICE O/P NEW LOW 30 MIN: CPT | Performed by: SURGERY

## 2024-05-15 PROCEDURE — 1036F TOBACCO NON-USER: CPT | Performed by: SURGERY

## 2024-05-15 PROCEDURE — 1159F MED LIST DOCD IN RCRD: CPT | Performed by: SURGERY

## 2024-05-15 PROCEDURE — 99213 OFFICE O/P EST LOW 20 MIN: CPT | Performed by: SURGERY

## 2024-05-15 PROCEDURE — 1126F AMNT PAIN NOTED NONE PRSNT: CPT | Performed by: SURGERY

## 2024-05-15 PROCEDURE — 1160F RVW MEDS BY RX/DR IN RCRD: CPT | Performed by: SURGERY

## 2024-05-15 SDOH — ECONOMIC STABILITY: FOOD INSECURITY: WITHIN THE PAST 12 MONTHS, THE FOOD YOU BOUGHT JUST DIDN'T LAST AND YOU DIDN'T HAVE MONEY TO GET MORE.: NEVER TRUE

## 2024-05-15 SDOH — ECONOMIC STABILITY: FOOD INSECURITY: WITHIN THE PAST 12 MONTHS, YOU WORRIED THAT YOUR FOOD WOULD RUN OUT BEFORE YOU GOT MONEY TO BUY MORE.: NEVER TRUE

## 2024-05-15 ASSESSMENT — LIFESTYLE VARIABLES
SKIP TO QUESTIONS 9-10: 1
HOW MANY STANDARD DRINKS CONTAINING ALCOHOL DO YOU HAVE ON A TYPICAL DAY: PATIENT DOES NOT DRINK
AUDIT-C TOTAL SCORE: 0
HOW OFTEN DO YOU HAVE A DRINK CONTAINING ALCOHOL: NEVER
HOW OFTEN DO YOU HAVE SIX OR MORE DRINKS ON ONE OCCASION: NEVER

## 2024-05-15 ASSESSMENT — PAIN SCALES - GENERAL: PAINLEVEL: 0-NO PAIN

## 2024-05-15 ASSESSMENT — ENCOUNTER SYMPTOMS
DEPRESSION: 0
OCCASIONAL FEELINGS OF UNSTEADINESS: 0
LOSS OF SENSATION IN FEET: 0

## 2024-05-15 ASSESSMENT — COLUMBIA-SUICIDE SEVERITY RATING SCALE - C-SSRS
2. HAVE YOU ACTUALLY HAD ANY THOUGHTS OF KILLING YOURSELF?: NO
1. IN THE PAST MONTH, HAVE YOU WISHED YOU WERE DEAD OR WISHED YOU COULD GO TO SLEEP AND NOT WAKE UP?: NO
6. HAVE YOU EVER DONE ANYTHING, STARTED TO DO ANYTHING, OR PREPARED TO DO ANYTHING TO END YOUR LIFE?: NO

## 2024-05-15 ASSESSMENT — PATIENT HEALTH QUESTIONNAIRE - PHQ9
1. LITTLE INTEREST OR PLEASURE IN DOING THINGS: NOT AT ALL
2. FEELING DOWN, DEPRESSED OR HOPELESS: NOT AT ALL
SUM OF ALL RESPONSES TO PHQ9 QUESTIONS 1 & 2: 0

## 2024-05-15 NOTE — PATIENT INSTRUCTIONS
T for any roxanna you for choosing [facility].  Stereotactic biopsy of the right breast will be performed as discussed.  You may experience some bruising following the procedure.  Please use ice, 2 extra strength or 3 ibuprofen every 6 hours as needed for pain.  Results will be immediately available for viewing on the  rachana when completed by the pathologist.  This is usually within 5-10 business days.  Follow-up breast clinic 2 weeks after your biopsy.  Contact the breast clinic for any questions regarding today's exam or your proposed procedure biopsy breast biopsy breast

## 2024-05-15 NOTE — PROGRESS NOTES
History Of Present Illness  HPI   78-year-old female underwent screening mammograms May 9 which demonstrated an area of persistent architectural distortion with microcalcifications within the right breast.  She denies any mass dimpling discharge.  Usually has mammograms on a regular basis.  Had excision of a left breast mass many years ago at [facility].  Her father passed away from bladder cancer.  Both of her grandparents passed away from malignancy, thyroid and spine.  She has a maternal aunt who was diagnosed with breast cancer in her 60s, another in her 50s as well as an aunt with uterine cancer.  She never smoked.   3 para 3.  Age of first menstrual period 16 age of first childbirth 28.  Was on hormonal supplementation for short period following onset of menopause in her early 40s.  Past Medical History  She has a past medical history of Encounter for screening for malignant neoplasm of vagina, Hyperlipidemia, Hypertension, Hypothyroidism, Personal history of diseases of the skin and subcutaneous tissue, Personal history of other diseases of the female genital tract, Personal history of other medical treatment, and Personal history of other medical treatment.    Surgical History  She has a past surgical history that includes Breast lumpectomy () and Tubal ligation (2015).     Social History  She reports that she has never smoked. She has never used smokeless tobacco. She reports that she does not drink alcohol and does not use drugs.    Family History  Family History   Problem Relation Name Age of Onset    Hypertension Mother      Cancer Mother      Hypertension Father      Other (bladder cancer) Father          age 64 upon passing    Breast cancer Mother's Sister  40 - 49    Breast cancer Other Father's sister 50 - 59        Allergies  Patient has no known allergies.    Review of Systems 10 review of systems is positive for cataracts history of peptic ulcer disease as well as ulcerative colitis  "diagnosed in 2023     Visit Vitals  BP (!) 138/92   Pulse 75   Temp 36.6 °C (97.8 °F)   Resp 18        Physical Exam  GENERAL  MENTAL STATUS - Alert. GENERAL APPEARANCE - Cooperative and well groomed. ORIENTATION - Oriented X4. BUILD & NUTRITION - Well nourished and well developed. HYDRATION - Well hydrated.    INTEGUMENTARY  GENERAL CHARACTERISTICS - Overall examination of the patient´s skin reveals no rashes. COLOR - not icteric. SKIN MOISTURE - normal skin moisture. TEMPERATURE - normal worth is noted.    HEAD AND NECK  HEAD  HEAD SHAPE - Atraumatic and normocephalic.  TRACHEA - midline.  THYROID  GLAND CHARACTERISTICS - normal size and consistency and no palpable nodules.    EYE  SCLERA/CONJUNCTIVA - BILATERAL - Anicteric.    CHEST AND LUNG EXAM  AUSCULTATION -  BREATH SOUNDS - Clear.    BREAST  NIPPLES: CHARACTERISTICS - LEFT - No rash. Not inverted. RIGHT - No rash. Not Inverted. DISCHARGE - LEFT - None. DISCHARGE - RIGHT - None.  BREAST - LEFT - Non tender. No mass, skin changes, biopsy scars, dimpling or dimpling or Peau d´Orange. RIGHT - Non tender. No mass, skin changes, biopsy scars, dimpling or dimpling or [place]au d´Or[place]. BILATERAL - Symmetric.    CARDIOVASCULAR  AUSCULTATION: RHYTHM - Regular. HEART SOUNDS - Normal heart sounds.  MURMURS & OTHER HEART SOUNDS - Auscultation of the heart reveals regular rate and no murmurs.    ABDOMEN  PALPATION/PERCUSSION - Palpation and percussion of the abdomen reveal soft, non tender, no mass and no hepatosplenomegaly.    LYMPHATIC  GENERAL LYMPHATICS  BREAST LYMPHATIC EXAM - No cervical adenopathy, supraclavicular adenopathy or axilliary adenopathy.       Last Recorded Vitals  Blood pressure (!) 138/92, pulse 75, temperature 36.6 °C (97.8 °F), resp. rate 18, height 1.575 m (5' 2\"), weight 68 kg (150 lb), SpO2 97%.    Relevant Results      BI mammo right diagnostic tomosynthesis    Result Date: 5/14/2024  Interpreted By:  Maria Fernanda Roberto, STUDY: BI MAMMO RIGHT " DIAGNOSTIC TOMOSYNTHESIS;  5/14/2024 9:48 am   ACCESSION NUMBER(S): [id number]   ORDERING CLINICIAN: SHERLY MURCIA   INDICATION: Signs/Symptoms:right asymmetry and calcs. Mammogram dated 05/09/2024 demonstrated an asymmetry with subtle distortion the lateral aspect of the right breast and calcifications in the right breast   COMPARISON: 05/09/2024, 10/21/2022, 06/03/2021, 02/03/2020   FINDINGS: 2D and tomosynthesis images were reviewed at 1 mm slice thickness.   Density:  There are areas of scattered fibroglandular tissue.   Spot compression views were obtained in the CC and MLO projections as well as true lateral view. The architectural distortion is persistent on the spot compression view in the CC projection. Stereotactic biopsy is recommended on. This was discussed in depth with the patient Spot compression magnification views were obtained of calcifications right breast. These are scattered small and round. There are no pleomorphic, linear branching forms. There is no linear distribution. These are benign in appearance, likely sclerosing adenosis.       1. Persistent architectural distortion on the spot compression view in the cc projection. Stereotactic biopsy is recommended. This was discussed in depth with the patient. She was scheduled today for breast surgical consultation and subsequent stereotactic biopsy. 2. Benign-appearing microcalcifications of the right breast.   BI-RADS CATEGORY:   BI-RADS Category:  4 Suspicious. Recommendation:  Surgical Consultation and Biopsy. Recommended Date:  Immediate. Laterality:  Right.   For any future breast imaging appointments, please call [phone]).   MACRO: Critical Finding:  See findings. Notification was initiated on 5/14/2024 at 10:12 am by  Maria Fernanda Roberto.  (**-YCF-**) Instructions:   Signed by: Maria Fernanda Roberto 5/14/2024 10:12 AM Dictation workstation:   [id number]    BI mammo bilateral screening tomosynthesis    Result Date: 5/9/2024  Interpreted  By:  Manuel Sun, STUDY: BI MAMMO BILATERAL SCREENING TOMOSYNTHESIS;  5/9/2024 10:16 am   ACCESSION NUMBER(S): [id number]   ORDERING CLINICIAN: SHERLY MURCIA   INDICATION: Screening. History of benign left excisional biopsy.   COMPARISON: 10/21/2022, 06/03/2021   FINDINGS: 2D and tomosynthesis images were reviewed at 1 mm slice thickness.   Density:  There are areas of scattered fibroglandular tissue.   There is asymmetry with subtle distortion slight lateral right breast on the CC projection. There is a grouping of microcalcifications at this location probably slightly superiorly located on the MLO projection. No definite additional new suspicious masses or calcifications are identified. Scar marker upper outer left breast with some underlying density likely related to post biopsy changes and similar to prior.       Right breast asymmetry and microcalcifications as described for which spot-compression, exaggerated CC, and true lateral views as well as magnification views recommended. Ultrasound may be obtained as indicated.   Stable mammographic appearance of the left breast.   Based on the Tyrer-Cuzick model for breast cancer risk assessment, the patient's lifetime risk of breast cancer is 3.2%. Patients with over a 20% lifetime risk of developing breast cancer may benefit from additional screening with breast MRI or ultrasound. Please note that this estimate is based on responses provided on the patient questionnaire. For more information regarding high risk consultation, please call [phone].   BI-RADS CATEGORY: BI-RADS Category:  0 Incomplete; Need Additional Imaging Evaluation and/or Prior Mammograms for Comparison. Recommendation:  Additional Imaging. Recommended Date:  Immediate. Laterality:  Right.   For any future breast imaging appointments, please call [phone] ([phone]).     MACRO: None   Signed by: Manuel Sun 5/9/2024 12:45 PM Dictation workstation:   [id number]         @[facility]@    Assessment/Plan       I have reviewed the diagnostic imaging and agree with the recommendation for stereotactic biopsy of the right breast.  I discussed the risk benefits the alternatives as well as the risk and benefits the alternatives to procedure including but not limited to the risk of bleeding infection and the need for additional procedures for diagnosis and/or treatment.  I have indicated potential for abnormal findings at 15 to 30%.  Questions were answered patient agrees to proceed       I spent 20 minutes in the professional and overall care of this patient.      Anurag Pedro MD

## 2024-05-16 ENCOUNTER — OFFICE VISIT (OUTPATIENT)
Dept: GASTROENTEROLOGY | Facility: CLINIC | Age: 79
End: 2024-05-16
Payer: MEDICARE

## 2024-05-16 VITALS
HEART RATE: 73 BPM | HEIGHT: 62 IN | BODY MASS INDEX: 27.82 KG/M2 | SYSTOLIC BLOOD PRESSURE: 128 MMHG | DIASTOLIC BLOOD PRESSURE: 78 MMHG | WEIGHT: 151.2 LBS

## 2024-05-16 DIAGNOSIS — K51.20: Primary | ICD-10-CM

## 2024-05-16 PROCEDURE — 99213 OFFICE O/P EST LOW 20 MIN: CPT | Performed by: INTERNAL MEDICINE

## 2024-05-16 PROCEDURE — 1036F TOBACCO NON-USER: CPT | Performed by: INTERNAL MEDICINE

## 2024-05-16 PROCEDURE — 1160F RVW MEDS BY RX/DR IN RCRD: CPT | Performed by: INTERNAL MEDICINE

## 2024-05-16 PROCEDURE — 1159F MED LIST DOCD IN RCRD: CPT | Performed by: INTERNAL MEDICINE

## 2024-05-16 RX ORDER — SULFASALAZINE 500 MG/1
500 TABLET ORAL 4 TIMES DAILY
Qty: 120 TABLET | Refills: 11 | Status: SHIPPED | OUTPATIENT
Start: 2024-05-16 | End: 2025-05-16

## 2024-05-16 NOTE — PROGRESS NOTES
Subjective     History of Present Illness:   Sherin Hancock is a 78 y.o. female who presents to GI clinic for ***.        Review of Systems  Review of Systems    Past Medical History   has a past medical history of Encounter for screening for malignant neoplasm of vagina, Hyperlipidemia, Hypertension, Hypothyroidism, Personal history of diseases of the skin and subcutaneous tissue, Personal history of other diseases of the female genital tract, Personal history of other medical treatment, and Personal history of other medical treatment.     Social History   reports that she has never smoked. She has never used smokeless tobacco. She reports that she does not drink alcohol and does not use drugs.     Family History  family history includes Breast cancer (age of onset: 40 - 49) in her mother's sister; Breast cancer (age of onset: 50 - 59) in an other family member; Cancer in her mother; Hypertension in her father and mother; bladder cancer in her father.     Allergies  No Known Allergies    Medications  Current Outpatient Medications   Medication Instructions    amLODIPine (NORVASC) 5 mg, oral, Daily    aspirin 81 mg, oral, Daily, CHEW AND SWALLOW. PLEASE START ONE WEEK BEFORE ANGIOGRAM PROCEDURE<BR>    atorvastatin (LIPITOR) 20 mg, oral, Daily    mesalamine (ROWASA) 4 g, rectal, Nightly, Use as directed    prednisoLONE acetate (Pred-Forte) 1 % ophthalmic suspension INSTILL ONE DROP into the right eye FOUR TIMES A DAY    sulfaSALAzine (AZULFIDINE) 500 mg, oral, 4 times daily    Synthroid 88 mcg, oral, Daily before breakfast        Objective   Visit Vitals  /78 (BP Location: Left arm, Patient Position: Sitting)   Pulse 73        Physical Exam  Physical Exam      Diagnosis  1. Ulcerative colitis confined to rectum (Multi)  sulfaSALAzine (Azulfidine) 500 mg tablet           Assessment/Plan   Sherin Hancock is a 78 y.o. female who presents to GI clinic for flareup of ulcerative colitis.  Recurrent start on  sulfasalazine 500 mg 4 times daily follow-up in 1 month.          Gonzalez Montilla MD

## 2024-05-16 NOTE — PROGRESS NOTES
This 78-year-old lady is here because of recent exacerbation of what appears to be inflammatory bowel disease.  This was characterized by some abdominal cramps and blood and some mucus in the stool she had prior to this episode of rectal bleeding and mucus at that time she had a colonoscopy which showed evidence of ulcerative colitis in the left side of the colon confirmed by biopsies and she responded very nicely to mesalamine.retention enemas.  At that time we discussed whether or not we should have long-term therapy with mesalamine and sulfasalazine and we decided to forego this as she had complete remission of her symptoms.  With no prior history of inflammatory bowel disease.    Since that time she has been under a lot of stress she had bilateral cataract surgery with good results she also had an abnormal mammogram she is going for a biopsy on Monday she had problem with her children.    Past medical history include cerebral aneurysm treated with coils she was on Plavix currently she is taking only aspirin she has also hypertension.    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  Overall health is otherwise good.  Family history unchanged review of system essentially unchanged apart from the recent episode of flareup of colitis about 4 to 5 weeks ago she has been doing well.    At the time of examination vital signs were reviewed.  HEENT is unremarkable recent cataract surgeries carotid 2+ JVD is flat heart sounds were normal chest is clear abdominal exam is unremarkable no pedal edema.  Clinical impression she had an exacerbation of what appears to be inflammatory bowel disease and now completely subsided.  After detailed discussion we decided to start her on sulfasalazine 500 mg 4 times daily.  I will see her for follow-up in 1 month.  Side effects especially pertaining to sulfa was discussed with the Subjective   Patient ID: Sherin aHncock is a 78 y.o. female who presents for Ulcerative Colitis (Pt c/o   bloody tissue with morning bm onset 2 months pt denies any sx at present ).  HPI    Review of Systems    Objective   Physical Exam    Assessment/Plan     Recent exacerbation of ulcerative colitis.  Plan started on sulfasalazine 500 mg 4 times daily prophylactically to reduce recurrence of flareups.       Gonzalez Montilla MD 05/16/24 3:48 PM

## 2024-05-17 ENCOUNTER — APPOINTMENT (OUTPATIENT)
Dept: RADIOLOGY | Facility: CLINIC | Age: 79
End: 2024-05-17
Payer: MEDICARE

## 2024-05-20 ENCOUNTER — HOSPITAL ENCOUNTER (OUTPATIENT)
Dept: RADIOLOGY | Facility: CLINIC | Age: 79
Discharge: HOME | End: 2024-05-20
Payer: MEDICARE

## 2024-05-20 VITALS
HEART RATE: 79 BPM | DIASTOLIC BLOOD PRESSURE: 79 MMHG | SYSTOLIC BLOOD PRESSURE: 134 MMHG | OXYGEN SATURATION: 99 % | RESPIRATION RATE: 18 BRPM

## 2024-05-20 DIAGNOSIS — R92.8 ABNORMAL FINDING ON BREAST IMAGING: ICD-10-CM

## 2024-05-20 PROCEDURE — 2720000007 HC OR 272 NO HCPCS

## 2024-05-20 PROCEDURE — 88342 IMHCHEM/IMCYTCHM 1ST ANTB: CPT | Performed by: STUDENT IN AN ORGANIZED HEALTH CARE EDUCATION/TRAINING PROGRAM

## 2024-05-20 PROCEDURE — 88342 IMHCHEM/IMCYTCHM 1ST ANTB: CPT | Mod: TC,91,PARLAB | Performed by: SURGERY

## 2024-05-20 PROCEDURE — 19081 BX BREAST 1ST LESION STRTCTC: CPT | Mod: RT

## 2024-05-20 PROCEDURE — 77065 DX MAMMO INCL CAD UNI: CPT

## 2024-05-20 PROCEDURE — 88305 TISSUE EXAM BY PATHOLOGIST: CPT | Performed by: STUDENT IN AN ORGANIZED HEALTH CARE EDUCATION/TRAINING PROGRAM

## 2024-05-20 NOTE — DISCHARGE INSTRUCTIONS
REMOVE YOUR BANDAID OVER THE SITE TOMORROW BEFORE YOU SHOWER. UNDER THE BANDAID ARE STERI-STRIPS, KEEP THOSE ON UNTIL THEY FALL OFF ON THEIR OWN. IF THEY STAY ON FOR 5 DAYS, REMOVE THEM. NO SOAKING IN A BATH, POOL, OR HOT TUB FOR 48 HOURS. NO HEAVY LIFTING FOR 1-2 DAYS. USE ICE 20 MINUTES ON AND 20 MINUTES OFF TODAY. LOOK FOR SIGNS AND SYMPTOMS OF INFECTION- REDNESS, SWELLING, FEVER, AND PAIN. CALL IF YOU HAVE ANY OF THOSE. IT IS NORMAL TO [place]ISE. IT CAN LAST UP TO A MONTH. TAKE OVER THE COUNTER PAIN MEDICATIONS FOR PAIN. YOUR RESULTS WILL POST TO YOUR MY CHART IN 3-10 DAYS. CALL WITH ANY QUESTIONS, [phone] OR [facility] BREAST NURSE NAVIGATOR [phone].

## 2024-05-20 NOTE — POST-PROCEDURE NOTE
Interventional Radiology Brief Postprocedure Note    Attending: Maria Fernanda Roberto MD      Assistant:     Diagnosis: right breast architectural distortion    Description of procedure: stereotactic biopsy right breast     Anesthesia:  Local    Complications: None    Estimated Blood Loss: none    Medications (Filter: Administrations occurring from 1145 to 1145 on 05/20/24)      None          ID Type Source Tests Collected by Time   1 : RIGHT  BREAST 1100 6 CM FN Tissue BREAST CORE BIOPSY RIGHT SURGICAL PATHOLOGY EXAM Maria Fernanda Roberto MD 5/20/2024 1135         See detailed result report with images in PACS.    The patient tolerated the procedure well without incident or complication and is in stable condition.

## 2024-05-28 ENCOUNTER — TELEPHONE (OUTPATIENT)
Dept: GASTROENTEROLOGY | Facility: CLINIC | Age: 79
End: 2024-05-28
Payer: MEDICARE

## 2024-05-28 NOTE — TELEPHONE ENCOUNTER
P called in c/o allergic reaction to sulfaSALAzine. Pt states she was taking medication onset 7 days and experienced muscle and bone ache, rash and hives, discontinued medication.

## 2024-05-29 LAB
LAB AP ASR DISCLAIMER: NORMAL
LABORATORY COMMENT REPORT: NORMAL
PATH REPORT.COMMENTS IMP SPEC: NORMAL
PATH REPORT.FINAL DX SPEC: NORMAL
PATH REPORT.GROSS SPEC: NORMAL
PATH REPORT.TOTAL CANCER: NORMAL

## 2024-06-03 ENCOUNTER — PREP FOR PROCEDURE (OUTPATIENT)
Dept: SURGERY | Facility: CLINIC | Age: 79
End: 2024-06-03

## 2024-06-03 ENCOUNTER — OFFICE VISIT (OUTPATIENT)
Dept: SURGERY | Facility: CLINIC | Age: 79
End: 2024-06-03
Payer: MEDICARE

## 2024-06-03 VITALS
HEART RATE: 75 BPM | SYSTOLIC BLOOD PRESSURE: 132 MMHG | TEMPERATURE: 98 F | WEIGHT: 152 LBS | HEIGHT: 62 IN | RESPIRATION RATE: 18 BRPM | BODY MASS INDEX: 27.97 KG/M2 | DIASTOLIC BLOOD PRESSURE: 76 MMHG | OXYGEN SATURATION: 95 %

## 2024-06-03 DIAGNOSIS — R93.89 ABNORMAL FINDINGS ON DIAGNOSTIC IMAGING OF OTHER SPECIFIED BODY STRUCTURES: ICD-10-CM

## 2024-06-03 DIAGNOSIS — N60.91 ATYPICAL DUCTAL HYPERPLASIA OF RIGHT BREAST: Primary | ICD-10-CM

## 2024-06-03 DIAGNOSIS — N63.11 MASS OF UPPER OUTER QUADRANT OF RIGHT BREAST: ICD-10-CM

## 2024-06-03 PROCEDURE — 99213 OFFICE O/P EST LOW 20 MIN: CPT | Performed by: SURGERY

## 2024-06-03 RX ORDER — SODIUM CHLORIDE, SODIUM LACTATE, POTASSIUM CHLORIDE, CALCIUM CHLORIDE 600; 310; 30; 20 MG/100ML; MG/100ML; MG/100ML; MG/100ML
100 INJECTION, SOLUTION INTRAVENOUS CONTINUOUS
OUTPATIENT
Start: 2024-06-03

## 2024-06-03 RX ORDER — CEFAZOLIN SODIUM 2 G/100ML
2 INJECTION, SOLUTION INTRAVENOUS ONCE
OUTPATIENT
Start: 2024-06-03 | End: 2024-06-03

## 2024-06-03 ASSESSMENT — PAIN SCALES - GENERAL: PAINLEVEL: 0-NO PAIN

## 2024-06-03 ASSESSMENT — ENCOUNTER SYMPTOMS
OCCASIONAL FEELINGS OF UNSTEADINESS: 0
LOSS OF SENSATION IN FEET: 0
DEPRESSION: 0

## 2024-06-03 NOTE — PATIENT INSTRUCTIONS
Thank you for choosing [facility].  Your recent right breast biopsy indicates atypical cells in the area of your findings.  Subsequently excision of this area has been recommended and will be performed in the operating room after localizing by a Magseed.  Please contact the breast clinic for any questions regarding today's exam or your proposed procedure.  Please notify the breast clinic should the area become red warm or any drainage.  Please also contact the breast clinic for change in your health care prior to your proposed procedure

## 2024-06-03 NOTE — PROGRESS NOTES
History Of Present Illness  HPI follow-up to stereotactic biopsy right breast May 14.  Complains of bruising over the site.  The area is improving.  She denies any redness or drainage from the site.  Pathology shows atypical ductal hyperplasia     Past Medical History  She has a past medical history of Encounter for screening for malignant neoplasm of vagina, Hyperlipidemia, Hypertension, Hypothyroidism, Personal history of diseases of the skin and subcutaneous tissue, Personal history of other diseases of the female genital tract, Personal history of other medical treatment, and Personal history of other medical treatment.    Surgical History  She has a past surgical history that includes Breast lumpectomy (1990's) and Tubal ligation (07/17/2015).     Social History  She reports that she has never smoked. She has never used smokeless tobacco. She reports that she does not drink alcohol and does not use drugs.    Family History  Family History   Problem Relation Name Age of Onset    Hypertension Mother      Cancer Mother      Hypertension Father      Other (bladder cancer) Father          age 64 upon passing    Breast cancer Mother's Sister  40 - 49    Breast cancer Other Father's sister 50 - 59        Allergies  Sulfasalazine    Review of Systems 10 review of systems otherwise negative     Visit Vitals  /76   Pulse 75   Temp 36.7 °C (98 °F)   Resp 18        Physical Exam GENERAL  MENTAL STATUS - Alert. GENERAL APPEARANCE - Cooperative and well groomed. ORIENTATION - Oriented X4. BUILD & NUTRITION - Well nourished and well developed. HYDRATION - Well hydrated.    INTEGUMENTARY  GENERAL CHARACTERISTICS - Overall examination of the patient´s skin reveals no rashes. COLOR - not icteric. SKIN MOISTURE - normal skin moisture. TEMPERATURE - normal worth is noted.    HEAD AND NECK  HEAD  HEAD SHAPE - Atraumatic and normocephalic.  TRACHEA - midline.  THYROID  GLAND CHARACTERISTICS - normal size and consistency and no  "palpable nodules.    EYE  SCLERA/CONJUNCTIVA - BILATERAL - Anicteric.    CHEST AND LUNG EXAM  AUSCULTATION -  BREATH SOUNDS - Clear.    BREAST  NIPPLES: CHARACTERISTICS - LEFT - No rash. Not inverted. RIGHT - No rash. Not Inverted. DISCHARGE - LEFT - None. DISCHARGE - RIGHT - None.  BREAST - LEFT - Non tender. No mass, skin changes, biopsy scars, dimpling or dimpling or Peau d´Orange. RIGHT - Non tender.  Ecchymosis with hematoma noted over central portion of the right breast not red not warm. BILATERAL - Symmetric.    CARDIOVASCULAR  AUSCULTATION: RHYTHM - Regular. HEART SOUNDS - Normal heart sounds.  MURMURS & OTHER HEART SOUNDS - Auscultation of the heart reveals regular rate and no murmurs.    ABDOMEN  PALPATION/PERCUSSION - Palpation and percussion of the abdomen reveal soft, non tender, no mass and no hepatosplenomegaly.    LYMPHATIC  GENERAL LYMPHATICS  BREAST LYMPHATIC EXAM - No cervical adenopathy, supraclavicular adenopathy or axilliary adenopathy.         Last Recorded Vitals  Blood pressure 132/76, pulse 75, temperature 36.7 °C (98 °F), resp. rate 18, height 1.575 m (5' 2\"), weight 68.9 kg (152 lb), SpO2 95%.    Relevant Results      BI stereotactic guided breast right localization and biopsy    Addendum Date: 5/31/2024    Interpreted By:  Maria Fernanda Roberto, ADDENDUM: The final pathology for stereotactic biopsy calcifications right breast: Atypical ductal hyperplasia arising in a complex sclerosing lesion Pathology is concordant   Estimated size of mass/extent of disease: 2.0 x 1.6 x 1.3 cm.   Ipsilateral lymph nodes: Not evaluated   Recommendation: Surgical consultation for complex sclerosing lesion and atypical ductal hyperplasia   MACRO: Critical Finding:  See findings. Notification was initiated on 5/31/2024 at 3:40 pm by  Maria Fernanda Roberto.  (**-YCF-**) Instructions:     Signed by: Maria Fernanda Roberto 5/31/2024 3:40 PM   -------- ORIGINAL REPORT -------- Dictation workstation:   [id number]    Result Date: " 5/31/2024  Interpreted By:  Maria Fernanda Roberto, STUDY: BI STEREOTACTIC GUIDED BREAST RIGHT LOCALIZATION AND BIOPSY; BI BREAST BIOPSY CLIP IMAGING;  5/20/2024 11:53 am; 5/20/2024 11:55 am   ACCESSION NUMBER(S): [id number]; [id number]   ORDERING CLINICIAN: CHAPO PALUMBO   INDICATION: Signs/Symptoms:right breast distortion; Signs/Symptoms:post right breast biopsy. Architectural distortion right breast   TECHNIQUE: PREPROCEDURE CONSULTATION: The procedure was explained to the patient including the risks, benefits, and alternatives.  Medications were discussed, including any prior use of blood thinning medications by the patient.  Patient allergies were reviewed.   The risks, including but not limited to infection and bleeding, were reviewed and the patient agreed to undergo the procedure.   Prior to the procedure, an audible timeout was done to verify patient identification, site and type of procedure. The right breast was marked prior to the procedure. Dr. Roberto, a radiology nurse, and a mammography technologist were present.   FINDINGS PROCEDURE: An upright vacuum assisted stereotactic biopsy was performed. A  view of the  right breast localized the calcifications of concern. A CC from above approach was used. The breast was prepped and draped in a sterile manner. 10 ML of buffered 1% lidocaine was injected subcutaneously and then into the deeper tissues. A small incision was made. Multiple tissue core specimens were then obtained with a 9-gauge vacuum assisted biopsy needle with minimal bleeding (estimated blood loss less than 10 mL). A  Securmark was then placed into the biopsy site. Postprocedure digital films confirm the clip is in place at the biopsy site. After discharging the patient, the tissue was immediately sent to the pathology department.   The patient experienced no complications during the procedure. Home-going/follow-up instructions were reviewed with the patient before she left the  department.   POSTPROCEDURE MAMMOGRAM: Postprocedure mammogram is concordant. There is a titanium clip at the site of the previously seen architectural distortion in the right breast. There are a few microcalcifications in the specimen radiograph. This confirms that the area biopsied stereotactically is the same area seen mammographically.       Status post stereotactic guided core needle biopsy of architectural distortion in the right breast followed by tissue marker placement. Postprocedure mammogram concordant. Pathology is pending.   MACRO: None   Signed by: Maria Fernanda Roberto 5/20/2024 1:29 PM Dictation workstation:   [id number]    BI breast biopsy clip imaging    Addendum Date: 5/31/2024    Interpreted By:  Maria Fernanda Roberto, ADDENDUM: The final pathology for stereotactic biopsy calcifications right breast: Atypical ductal hyperplasia arising in a complex sclerosing lesion Pathology is concordant   Estimated size of mass/extent of disease: 2.0 x 1.6 x 1.3 cm.   Ipsilateral lymph nodes: Not evaluated   Recommendation: Surgical consultation for complex sclerosing lesion and atypical ductal hyperplasia   MACRO: Critical Finding:  See findings. Notification was initiated on 5/31/2024 at 3:40 pm by  Maria Fernanda Roberto.  (**-YCF-**) Instructions:     Signed by: Maria Fernanda Roberto 5/31/2024 3:40 PM   -------- ORIGINAL REPORT -------- Dictation workstation:   [id number]    Result Date: 5/31/2024  Interpreted By:  Maria Fernanda Roberto, STUDY: BI STEREOTACTIC GUIDED BREAST RIGHT LOCALIZATION AND BIOPSY; BI BREAST BIOPSY CLIP IMAGING;  5/20/2024 11:53 am; 5/20/2024 11:55 am   ACCESSION NUMBER(S): [id number]; [id number]   ORDERING CLINICIAN: CHAPO PALUMBO   INDICATION: Signs/Symptoms:right breast distortion; Signs/Symptoms:post right breast biopsy. Architectural distortion right breast   TECHNIQUE: PREPROCEDURE CONSULTATION: The procedure was explained to the patient including the risks, benefits, and alternatives.  Medications  were discussed, including any prior use of blood thinning medications by the patient.  Patient allergies were reviewed.   The risks, including but not limited to infection and bleeding, were reviewed and the patient agreed to undergo the procedure.   Prior to the procedure, an audible timeout was done to verify patient identification, site and type of procedure. The right breast was marked prior to the procedure. Dr. Roberto, a radiology nurse, and a mammography technologist were present.   FINDINGS PROCEDURE: An upright vacuum assisted stereotactic biopsy was performed. A  view of the  right breast localized the calcifications of concern. A CC from above approach was used. The breast was prepped and draped in a sterile manner. 10 ML of buffered 1% lidocaine was injected subcutaneously and then into the deeper tissues. A small incision was made. Multiple tissue core specimens were then obtained with a 9-gauge vacuum assisted biopsy needle with minimal bleeding (estimated blood loss less than 10 mL). A  Securmark was then placed into the biopsy site. Postprocedure digital films confirm the clip is in place at the biopsy site. After discharging the patient, the tissue was immediately sent to the pathology department.   The patient experienced no complications during the procedure. Home-going/follow-up instructions were reviewed with the patient before she left the department.   POSTPROCEDURE MAMMOGRAM: Postprocedure mammogram is concordant. There is a titanium clip at the site of the previously seen architectural distortion in the right breast. There are a few microcalcifications in the specimen radiograph. This confirms that the area biopsied stereotactically is the same area seen mammographically.       Status post stereotactic guided core needle biopsy of architectural distortion in the right breast followed by tissue marker placement. Postprocedure mammogram concordant. Pathology is pending.   MACRO: None    Signed by: Maria Fernanda Roberto 5/20/2024 1:29 PM Dictation workstation:   [id number]    BI mammo right diagnostic tomosynthesis    Result Date: 5/14/2024  Interpreted By:  Maria Fernanda Roberto, STUDY: BI MAMMO RIGHT DIAGNOSTIC TOMOSYNTHESIS;  5/14/2024 9:48 am   ACCESSION NUMBER(S): [id number]   ORDERING CLINICIAN: SHERLY MURCIA   INDICATION: Signs/Symptoms:right asymmetry and calcs. Mammogram dated 05/09/2024 demonstrated an asymmetry with subtle distortion the lateral aspect of the right breast and calcifications in the right breast   COMPARISON: 05/09/2024, 10/21/2022, 06/03/2021, 02/03/2020   FINDINGS: 2D and tomosynthesis images were reviewed at 1 mm slice thickness.   Density:  There are areas of scattered fibroglandular tissue.   Spot compression views were obtained in the CC and MLO projections as well as true lateral view. The architectural distortion is persistent on the spot compression view in the CC projection. Stereotactic biopsy is recommended on. This was discussed in depth with the patient Spot compression magnification views were obtained of calcifications right breast. These are scattered small and round. There are no pleomorphic, linear branching forms. There is no linear distribution. These are benign in appearance, likely sclerosing adenosis.       1. Persistent architectural distortion on the spot compression view in the cc projection. Stereotactic biopsy is recommended. This was discussed in depth with the patient. She was scheduled today for breast surgical consultation and subsequent stereotactic biopsy. 2. Benign-appearing microcalcifications of the right breast.   BI-RADS CATEGORY:   BI-RADS Category:  4 Suspicious. Recommendation:  Surgical Consultation and Biopsy. Recommended Date:  Immediate. Laterality:  Right.   For any future breast imaging appointments, please call [phone]).   MACRO: Critical Finding:  See findings. Notification was initiated on 5/14/2024 at 10:12 am by   Maria Fernanda Roberto.  (**-YCF-**) Instructions:   Signed by: Maria Fernanda Roberto 5/14/2024 10:12 AM Dictation workstation:   [id number]    BI mammo bilateral screening tomosynthesis    Result Date: 5/9/2024  Interpreted By:  Manuel Sun, STUDY: BI MAMMO BILATERAL SCREENING TOMOSYNTHESIS;  5/9/2024 10:16 am   ACCESSION NUMBER(S): [id number]   ORDERING CLINICIAN: SHERLY MURCIA   INDICATION: Screening. History of benign left excisional biopsy.   COMPARISON: 10/21/2022, 06/03/2021   FINDINGS: 2D and tomosynthesis images were reviewed at 1 mm slice thickness.   Density:  There are areas of scattered fibroglandular tissue.   There is asymmetry with subtle distortion slight lateral right breast on the CC projection. There is a grouping of microcalcifications at this location probably slightly superiorly located on the MLO projection. No definite additional new suspicious masses or calcifications are identified. Scar marker upper outer left breast with some underlying density likely related to post biopsy changes and similar to prior.       Right breast asymmetry and microcalcifications as described for which spot-compression, exaggerated CC, and true lateral views as well as magnification views recommended. Ultrasound may be obtained as indicated.   Stable mammographic appearance of the left breast.   Based on the Tyrer-Cuzick model for breast cancer risk assessment, the patient's lifetime risk of breast cancer is 3.2%. Patients with over a 20% lifetime risk of developing breast cancer may benefit from additional screening with breast MRI or ultrasound. Please note that this estimate is based on responses provided on the patient questionnaire. For more information regarding high risk consultation, please call [phone].   BI-RADS CATEGORY: BI-RADS Category:  0 Incomplete; Need Additional Imaging Evaluation and/or Prior Mammograms for Comparison. Recommendation:  Additional Imaging. Recommended Date:  Immediate. Laterality:   Right.   For any future breast imaging appointments, please call [phone]).     MACRO: None   Signed by: Manuel Kieraferoz 5/9/2024 12:45 PM Dictation workstation:   [id number]        @[email or web address]@    Assessment/Plan       I have reviewed the pathology with the patient and indicated approximate 15% chance of additional findings.  I recommended excisional biopsy of the area by [facility] localization.  I discussed the risk of bleeding infection and the need for additional procedures for diagnosis and/or treatment.  Questions were answered.  Patient agrees to proceed.  Surgery is planned for approximately 3 weeks to allow for resolution of the hematoma       I spent 20 minutes in the professional and overall care of this patient.      Anurag Pedro MD

## 2024-06-03 NOTE — H&P (VIEW-ONLY)
History Of Present Illness  HPI follow-up to stereotactic biopsy right breast May 14.  Complains of bruising over the site.  The area is improving.  She denies any redness or drainage from the site.  Pathology shows atypical ductal hyperplasia     Past Medical History  She has a past medical history of Encounter for screening for malignant neoplasm of vagina, Hyperlipidemia, Hypertension, Hypothyroidism, Personal history of diseases of the skin and subcutaneous tissue, Personal history of other diseases of the female genital tract, Personal history of other medical treatment, and Personal history of other medical treatment.    Surgical History  She has a past surgical history that includes Breast lumpectomy (1990's) and Tubal ligation (07/17/2015).     Social History  She reports that she has never smoked. She has never used smokeless tobacco. She reports that she does not drink alcohol and does not use drugs.    Family History  Family History   Problem Relation Name Age of Onset    Hypertension Mother      Cancer Mother      Hypertension Father      Other (bladder cancer) Father          age 64 upon passing    Breast cancer Mother's Sister  40 - 49    Breast cancer Other Father's sister 50 - 59        Allergies  Sulfasalazine    Review of Systems 10 review of systems otherwise negative     Visit Vitals  /76   Pulse 75   Temp 36.7 °C (98 °F)   Resp 18        Physical Exam GENERAL  MENTAL STATUS - Alert. GENERAL APPEARANCE - Cooperative and well groomed. ORIENTATION - Oriented X4. BUILD & NUTRITION - Well nourished and well developed. HYDRATION - Well hydrated.    INTEGUMENTARY  GENERAL CHARACTERISTICS - Overall examination of the patient´s skin reveals no rashes. COLOR - not icteric. SKIN MOISTURE - normal skin moisture. TEMPERATURE - normal worth is noted.    HEAD AND NECK  HEAD  HEAD SHAPE - Atraumatic and normocephalic.  TRACHEA - midline.  THYROID  GLAND CHARACTERISTICS - normal size and consistency and no  "palpable nodules.    EYE  SCLERA/CONJUNCTIVA - BILATERAL - Anicteric.    CHEST AND LUNG EXAM  AUSCULTATION -  BREATH SOUNDS - Clear.    BREAST  NIPPLES: CHARACTERISTICS - LEFT - No rash. Not inverted. RIGHT - No rash. Not Inverted. DISCHARGE - LEFT - None. DISCHARGE - RIGHT - None.  BREAST - LEFT - Non tender. No mass, skin changes, biopsy scars, dimpling or dimpling or Peau d´Orange. RIGHT - Non tender.  Ecchymosis with hematoma noted over central portion of the right breast not red not warm. BILATERAL - Symmetric.    CARDIOVASCULAR  AUSCULTATION: RHYTHM - Regular. HEART SOUNDS - Normal heart sounds.  MURMURS & OTHER HEART SOUNDS - Auscultation of the heart reveals regular rate and no murmurs.    ABDOMEN  PALPATION/PERCUSSION - Palpation and percussion of the abdomen reveal soft, non tender, no mass and no hepatosplenomegaly.    LYMPHATIC  GENERAL LYMPHATICS  BREAST LYMPHATIC EXAM - No cervical adenopathy, supraclavicular adenopathy or axilliary adenopathy.         Last Recorded Vitals  Blood pressure 132/76, pulse 75, temperature 36.7 °C (98 °F), resp. rate 18, height 1.575 m (5' 2\"), weight 68.9 kg (152 lb), SpO2 95%.    Relevant Results      BI stereotactic guided breast right localization and biopsy    Addendum Date: 5/31/2024    Interpreted By:  Maria Fernanda Roberto, ADDENDUM: The final pathology for stereotactic biopsy calcifications right breast: Atypical ductal hyperplasia arising in a complex sclerosing lesion Pathology is concordant   Estimated size of mass/extent of disease: 2.0 x 1.6 x 1.3 cm.   Ipsilateral lymph nodes: Not evaluated   Recommendation: Surgical consultation for complex sclerosing lesion and atypical ductal hyperplasia   MACRO: Critical Finding:  See findings. Notification was initiated on 5/31/2024 at 3:40 pm by  Maria Fernanda Roberto.  (**-YCF-**) Instructions:     Signed by: Maria Fernanda Roberto 5/31/2024 3:40 PM   -------- ORIGINAL REPORT -------- Dictation workstation:   [id number]    Result Date: " 5/31/2024  Interpreted By:  Maria Fernanda Roberto, STUDY: BI STEREOTACTIC GUIDED BREAST RIGHT LOCALIZATION AND BIOPSY; BI BREAST BIOPSY CLIP IMAGING;  5/20/2024 11:53 am; 5/20/2024 11:55 am   ACCESSION NUMBER(S): [id number]; [id number]   ORDERING CLINICIAN: CHAPO PALUMBO   INDICATION: Signs/Symptoms:right breast distortion; Signs/Symptoms:post right breast biopsy. Architectural distortion right breast   TECHNIQUE: PREPROCEDURE CONSULTATION: The procedure was explained to the patient including the risks, benefits, and alternatives.  Medications were discussed, including any prior use of blood thinning medications by the patient.  Patient allergies were reviewed.   The risks, including but not limited to infection and bleeding, were reviewed and the patient agreed to undergo the procedure.   Prior to the procedure, an audible timeout was done to verify patient identification, site and type of procedure. The right breast was marked prior to the procedure. Dr. Roberto, a radiology nurse, and a mammography technologist were present.   FINDINGS PROCEDURE: An upright vacuum assisted stereotactic biopsy was performed. A  view of the  right breast localized the calcifications of concern. A CC from above approach was used. The breast was prepped and draped in a sterile manner. 10 ML of buffered 1% lidocaine was injected subcutaneously and then into the deeper tissues. A small incision was made. Multiple tissue core specimens were then obtained with a 9-gauge vacuum assisted biopsy needle with minimal bleeding (estimated blood loss less than 10 mL). A  Securmark was then placed into the biopsy site. Postprocedure digital films confirm the clip is in place at the biopsy site. After discharging the patient, the tissue was immediately sent to the pathology department.   The patient experienced no complications during the procedure. Home-going/follow-up instructions were reviewed with the patient before she left the  department.   POSTPROCEDURE MAMMOGRAM: Postprocedure mammogram is concordant. There is a titanium clip at the site of the previously seen architectural distortion in the right breast. There are a few microcalcifications in the specimen radiograph. This confirms that the area biopsied stereotactically is the same area seen mammographically.       Status post stereotactic guided core needle biopsy of architectural distortion in the right breast followed by tissue marker placement. Postprocedure mammogram concordant. Pathology is pending.   MACRO: None   Signed by: Maria Fernanda Roberto 5/20/2024 1:29 PM Dictation workstation:   [id number]    BI breast biopsy clip imaging    Addendum Date: 5/31/2024    Interpreted By:  Maria Fernanda Roberto, ADDENDUM: The final pathology for stereotactic biopsy calcifications right breast: Atypical ductal hyperplasia arising in a complex sclerosing lesion Pathology is concordant   Estimated size of mass/extent of disease: 2.0 x 1.6 x 1.3 cm.   Ipsilateral lymph nodes: Not evaluated   Recommendation: Surgical consultation for complex sclerosing lesion and atypical ductal hyperplasia   MACRO: Critical Finding:  See findings. Notification was initiated on 5/31/2024 at 3:40 pm by  Maria Fernanda Roberto.  (**-YCF-**) Instructions:     Signed by: Maria Fernanda Roberto 5/31/2024 3:40 PM   -------- ORIGINAL REPORT -------- Dictation workstation:   [id number]    Result Date: 5/31/2024  Interpreted By:  Maria Fernanda Roberto, STUDY: BI STEREOTACTIC GUIDED BREAST RIGHT LOCALIZATION AND BIOPSY; BI BREAST BIOPSY CLIP IMAGING;  5/20/2024 11:53 am; 5/20/2024 11:55 am   ACCESSION NUMBER(S): [id number]; [id number]   ORDERING CLINICIAN: CHAPO PALUMBO   INDICATION: Signs/Symptoms:right breast distortion; Signs/Symptoms:post right breast biopsy. Architectural distortion right breast   TECHNIQUE: PREPROCEDURE CONSULTATION: The procedure was explained to the patient including the risks, benefits, and alternatives.  Medications  were discussed, including any prior use of blood thinning medications by the patient.  Patient allergies were reviewed.   The risks, including but not limited to infection and bleeding, were reviewed and the patient agreed to undergo the procedure.   Prior to the procedure, an audible timeout was done to verify patient identification, site and type of procedure. The right breast was marked prior to the procedure. Dr. Roberto, a radiology nurse, and a mammography technologist were present.   FINDINGS PROCEDURE: An upright vacuum assisted stereotactic biopsy was performed. A  view of the  right breast localized the calcifications of concern. A CC from above approach was used. The breast was prepped and draped in a sterile manner. 10 ML of buffered 1% lidocaine was injected subcutaneously and then into the deeper tissues. A small incision was made. Multiple tissue core specimens were then obtained with a 9-gauge vacuum assisted biopsy needle with minimal bleeding (estimated blood loss less than 10 mL). A  Securmark was then placed into the biopsy site. Postprocedure digital films confirm the clip is in place at the biopsy site. After discharging the patient, the tissue was immediately sent to the pathology department.   The patient experienced no complications during the procedure. Home-going/follow-up instructions were reviewed with the patient before she left the department.   POSTPROCEDURE MAMMOGRAM: Postprocedure mammogram is concordant. There is a titanium clip at the site of the previously seen architectural distortion in the right breast. There are a few microcalcifications in the specimen radiograph. This confirms that the area biopsied stereotactically is the same area seen mammographically.       Status post stereotactic guided core needle biopsy of architectural distortion in the right breast followed by tissue marker placement. Postprocedure mammogram concordant. Pathology is pending.   MACRO: None    Signed by: Maria Fernanda Roberto 5/20/2024 1:29 PM Dictation workstation:   [id number]    BI mammo right diagnostic tomosynthesis    Result Date: 5/14/2024  Interpreted By:  Maria Fernanda Roberto, STUDY: BI MAMMO RIGHT DIAGNOSTIC TOMOSYNTHESIS;  5/14/2024 9:48 am   ACCESSION NUMBER(S): [id number]   ORDERING CLINICIAN: SHERLY MURCIA   INDICATION: Signs/Symptoms:right asymmetry and calcs. Mammogram dated 05/09/2024 demonstrated an asymmetry with subtle distortion the lateral aspect of the right breast and calcifications in the right breast   COMPARISON: 05/09/2024, 10/21/2022, 06/03/2021, 02/03/2020   FINDINGS: 2D and tomosynthesis images were reviewed at 1 mm slice thickness.   Density:  There are areas of scattered fibroglandular tissue.   Spot compression views were obtained in the CC and MLO projections as well as true lateral view. The architectural distortion is persistent on the spot compression view in the CC projection. Stereotactic biopsy is recommended on. This was discussed in depth with the patient Spot compression magnification views were obtained of calcifications right breast. These are scattered small and round. There are no pleomorphic, linear branching forms. There is no linear distribution. These are benign in appearance, likely sclerosing adenosis.       1. Persistent architectural distortion on the spot compression view in the cc projection. Stereotactic biopsy is recommended. This was discussed in depth with the patient. She was scheduled today for breast surgical consultation and subsequent stereotactic biopsy. 2. Benign-appearing microcalcifications of the right breast.   BI-RADS CATEGORY:   BI-RADS Category:  4 Suspicious. Recommendation:  Surgical Consultation and Biopsy. Recommended Date:  Immediate. Laterality:  Right.   For any future breast imaging appointments, please call [phone]).   MACRO: Critical Finding:  See findings. Notification was initiated on 5/14/2024 at 10:12 am by   Maria Fernanda Roberto.  (**-YCF-**) Instructions:   Signed by: Maria Fernanda Roberto 5/14/2024 10:12 AM Dictation workstation:   [id number]    BI mammo bilateral screening tomosynthesis    Result Date: 5/9/2024  Interpreted By:  Manuel Sun, STUDY: BI MAMMO BILATERAL SCREENING TOMOSYNTHESIS;  5/9/2024 10:16 am   ACCESSION NUMBER(S): [id number]   ORDERING CLINICIAN: SHERLY MURCIA   INDICATION: Screening. History of benign left excisional biopsy.   COMPARISON: 10/21/2022, 06/03/2021   FINDINGS: 2D and tomosynthesis images were reviewed at 1 mm slice thickness.   Density:  There are areas of scattered fibroglandular tissue.   There is asymmetry with subtle distortion slight lateral right breast on the CC projection. There is a grouping of microcalcifications at this location probably slightly superiorly located on the MLO projection. No definite additional new suspicious masses or calcifications are identified. Scar marker upper outer left breast with some underlying density likely related to post biopsy changes and similar to prior.       Right breast asymmetry and microcalcifications as described for which spot-compression, exaggerated CC, and true lateral views as well as magnification views recommended. Ultrasound may be obtained as indicated.   Stable mammographic appearance of the left breast.   Based on the Tyrer-Cuzick model for breast cancer risk assessment, the patient's lifetime risk of breast cancer is 3.2%. Patients with over a 20% lifetime risk of developing breast cancer may benefit from additional screening with breast MRI or ultrasound. Please note that this estimate is based on responses provided on the patient questionnaire. For more information regarding high risk consultation, please call [phone].   BI-RADS CATEGORY: BI-RADS Category:  0 Incomplete; Need Additional Imaging Evaluation and/or Prior Mammograms for Comparison. Recommendation:  Additional Imaging. Recommended Date:  Immediate. Laterality:   Right.   For any future breast imaging appointments, please call [phone]).     MACRO: None   Signed by: Manuel Kieraferoz 5/9/2024 12:45 PM Dictation workstation:   [id number]        @Ludesi@    Assessment/Plan       I have reviewed the pathology with the patient and indicated approximate 15% chance of additional findings.  I recommended excisional biopsy of the area by Magseed localization.  I discussed the risk of bleeding infection and the need for additional procedures for diagnosis and/or treatment.  Questions were answered.  Patient agrees to proceed.  Surgery is planned for approximately 3 weeks to allow for resolution of the hematoma       I spent 20 minutes in the professional and overall care of this patient.      Anurag Pedro MD

## 2024-06-04 DIAGNOSIS — N60.91 ATYPICAL DUCTAL HYPERPLASIA OF RIGHT BREAST: ICD-10-CM

## 2024-06-04 DIAGNOSIS — R92.8 OTHER ABNORMAL AND INCONCLUSIVE FINDINGS ON DIAGNOSTIC IMAGING OF BREAST: ICD-10-CM

## 2024-06-14 ENCOUNTER — ANESTHESIA EVENT (OUTPATIENT)
Dept: OPERATING ROOM | Facility: HOSPITAL | Age: 79
End: 2024-06-14
Payer: MEDICARE

## 2024-06-14 ENCOUNTER — LAB (OUTPATIENT)
Dept: LAB | Facility: LAB | Age: 79
End: 2024-06-14
Payer: MEDICARE

## 2024-06-14 DIAGNOSIS — R93.89 ABNORMAL FINDINGS ON DIAGNOSTIC IMAGING OF OTHER SPECIFIED BODY STRUCTURES: ICD-10-CM

## 2024-06-14 DIAGNOSIS — N60.91 ATYPICAL DUCTAL HYPERPLASIA OF RIGHT BREAST: ICD-10-CM

## 2024-06-14 LAB
ANION GAP SERPL CALC-SCNC: 10 MMOL/L (ref 10–20)
BUN SERPL-MCNC: 15 MG/DL (ref 6–23)
CALCIUM SERPL-MCNC: 9.3 MG/DL (ref 8.6–10.3)
CHLORIDE SERPL-SCNC: 106 MMOL/L (ref 98–107)
CO2 SERPL-SCNC: 28 MMOL/L (ref 21–32)
CREAT SERPL-MCNC: 0.79 MG/DL (ref 0.5–1.05)
EGFRCR SERPLBLD CKD-EPI 2021: 78 ML/MIN/1.73M*2
ERYTHROCYTE [DISTWIDTH] IN BLOOD BY AUTOMATED COUNT: 14.4 % (ref 11.5–14.5)
GLUCOSE SERPL-MCNC: 99 MG/DL (ref 74–99)
HCT VFR BLD AUTO: 40.7 % (ref 36–46)
HGB BLD-MCNC: 12.9 G/DL (ref 12–16)
MCH RBC QN AUTO: 30.5 PG (ref 26–34)
MCHC RBC AUTO-ENTMCNC: 31.7 G/DL (ref 32–36)
MCV RBC AUTO: 96 FL (ref 80–100)
NRBC BLD-RTO: 0 /100 WBCS (ref 0–0)
PLATELET # BLD AUTO: 300 X10*3/UL (ref 150–450)
POTASSIUM SERPL-SCNC: 4.4 MMOL/L (ref 3.5–5.3)
RBC # BLD AUTO: 4.23 X10*6/UL (ref 4–5.2)
SODIUM SERPL-SCNC: 140 MMOL/L (ref 136–145)
WBC # BLD AUTO: 5.2 X10*3/UL (ref 4.4–11.3)

## 2024-06-14 PROCEDURE — 80048 BASIC METABOLIC PNL TOTAL CA: CPT

## 2024-06-14 PROCEDURE — 85027 COMPLETE CBC AUTOMATED: CPT

## 2024-06-17 ENCOUNTER — HOSPITAL ENCOUNTER (OUTPATIENT)
Dept: RADIOLOGY | Facility: CLINIC | Age: 79
Discharge: HOME | End: 2024-06-17
Payer: MEDICARE

## 2024-06-17 DIAGNOSIS — N60.91 ATYPICAL DUCTAL HYPERPLASIA OF RIGHT BREAST: ICD-10-CM

## 2024-06-17 DIAGNOSIS — R92.8 OTHER ABNORMAL AND INCONCLUSIVE FINDINGS ON DIAGNOSTIC IMAGING OF BREAST: ICD-10-CM

## 2024-06-17 PROCEDURE — 2780000003 HC OR 278 NO HCPCS

## 2024-06-17 PROCEDURE — 19281 PERQ DEVICE BREAST 1ST IMAG: CPT | Mod: RT

## 2024-06-17 PROCEDURE — A4648 IMPLANTABLE TISSUE MARKER: HCPCS

## 2024-06-17 PROCEDURE — 77065 DX MAMMO INCL CAD UNI: CPT

## 2024-06-27 RX ORDER — ACETAMINOPHEN 500 MG
2 TABLET ORAL EVERY 6 HOURS PRN
COMMUNITY

## 2024-06-27 NOTE — PREPROCEDURE INSTRUCTIONS
Current Medications   Medication Instructions    acetaminophen (Tylenol) 500 mg tablet May take am of surgery if having pain    amLODIPine (Norvasc) 5 mg tablet Take morning of surgery with sip of water, no other fluids    aspirin 81 mg chewable tablet Pt stated she stopped a week ago    atorvastatin (Lipitor) 20 mg tablet Continue until night before surgery    Synthroid 88 mcg tablet Take morning of surgery with sip of water, no other fluids          NPO Instructions: Nothing to eat after midnight. You may have up to 13 ounces of clear liquids 2 hours before your instructed ARRIVAL time to the hospital. You may take medications discussed during phone call with a small sip of water.     Additional Instructions: Enter through main entrance of [facility], located at [address]. Proceed to registration, located in the back right hand corner. You will need your ID and insurance card for registration. Please ensure you have a responsible adult to drive you home.     Take a shower the morning of or night before your procedure. After you shower avoid lotions, powders, deodorants or anything applied to the skin. If you wear contacts or glasses, wear the glasses. If you do not have glasses, please bring a case for your contacts. You may wear hearing aids and dentures, bring a case for them or we will provide one. Make sure you wear something loose and comfortable. Keep in mind your surgery type and wear something that will accommodate incisions or bandages. Please remove all jewelry.     For further questions Bj GUERAR can be contacted at [phone] between 7AM-3PM.

## 2024-06-28 ENCOUNTER — HOSPITAL ENCOUNTER (OUTPATIENT)
Facility: HOSPITAL | Age: 79
Setting detail: OUTPATIENT SURGERY
Discharge: HOME | End: 2024-06-28
Attending: SURGERY | Admitting: SURGERY
Payer: MEDICARE

## 2024-06-28 ENCOUNTER — APPOINTMENT (OUTPATIENT)
Dept: RADIOLOGY | Facility: HOSPITAL | Age: 79
End: 2024-06-28
Payer: MEDICARE

## 2024-06-28 ENCOUNTER — HOSPITAL ENCOUNTER (OUTPATIENT)
Dept: CARDIOLOGY | Facility: HOSPITAL | Age: 79
Discharge: HOME | End: 2024-06-28
Payer: MEDICARE

## 2024-06-28 ENCOUNTER — ANESTHESIA (OUTPATIENT)
Dept: OPERATING ROOM | Facility: HOSPITAL | Age: 79
End: 2024-06-28
Payer: MEDICARE

## 2024-06-28 VITALS
OXYGEN SATURATION: 98 % | DIASTOLIC BLOOD PRESSURE: 68 MMHG | TEMPERATURE: 97.7 F | HEART RATE: 62 BPM | SYSTOLIC BLOOD PRESSURE: 138 MMHG | RESPIRATION RATE: 16 BRPM

## 2024-06-28 DIAGNOSIS — N60.91 ATYPICAL DUCTAL HYPERPLASIA OF RIGHT BREAST: Primary | ICD-10-CM

## 2024-06-28 DIAGNOSIS — N63.11 MASS OF UPPER OUTER QUADRANT OF RIGHT BREAST: ICD-10-CM

## 2024-06-28 PROCEDURE — 19120 REMOVAL OF BREAST LESION: CPT | Performed by: SURGERY

## 2024-06-28 PROCEDURE — 7100000010 HC PHASE TWO TIME - EACH INCREMENTAL 1 MINUTE: Performed by: SURGERY

## 2024-06-28 PROCEDURE — 2500000004 HC RX 250 GENERAL PHARMACY W/ HCPCS (ALT 636 FOR OP/ED): Mod: JZ | Performed by: SURGERY

## 2024-06-28 PROCEDURE — 99100 ANES PT EXTEME AGE<1 YR&>70: CPT | Performed by: ANESTHESIOLOGY

## 2024-06-28 PROCEDURE — 93005 ELECTROCARDIOGRAM TRACING: CPT

## 2024-06-28 PROCEDURE — 7100000002 HC RECOVERY ROOM TIME - EACH INCREMENTAL 1 MINUTE: Performed by: SURGERY

## 2024-06-28 PROCEDURE — 7100000001 HC RECOVERY ROOM TIME - INITIAL BASE CHARGE: Performed by: SURGERY

## 2024-06-28 PROCEDURE — 2500000004 HC RX 250 GENERAL PHARMACY W/ HCPCS (ALT 636 FOR OP/ED): Performed by: NURSE ANESTHETIST, CERTIFIED REGISTERED

## 2024-06-28 PROCEDURE — 93010 ELECTROCARDIOGRAM REPORT: CPT | Performed by: INTERNAL MEDICINE

## 2024-06-28 PROCEDURE — 7100000009 HC PHASE TWO TIME - INITIAL BASE CHARGE: Performed by: SURGERY

## 2024-06-28 PROCEDURE — 2500000005 HC RX 250 GENERAL PHARMACY W/O HCPCS: Performed by: SURGERY

## 2024-06-28 PROCEDURE — 2720000007 HC OR 272 NO HCPCS: Performed by: SURGERY

## 2024-06-28 PROCEDURE — 3700000001 HC GENERAL ANESTHESIA TIME - INITIAL BASE CHARGE: Performed by: SURGERY

## 2024-06-28 PROCEDURE — 88307 TISSUE EXAM BY PATHOLOGIST: CPT | Mod: TC,PARLAB | Performed by: SURGERY

## 2024-06-28 PROCEDURE — 2500000004 HC RX 250 GENERAL PHARMACY W/ HCPCS (ALT 636 FOR OP/ED): Performed by: SURGERY

## 2024-06-28 PROCEDURE — 3600000008 HC OR TIME - EACH INCREMENTAL 1 MINUTE - PROCEDURE LEVEL THREE: Performed by: SURGERY

## 2024-06-28 PROCEDURE — 88307 TISSUE EXAM BY PATHOLOGIST: CPT | Performed by: PATHOLOGY

## 2024-06-28 PROCEDURE — 3700000002 HC GENERAL ANESTHESIA TIME - EACH INCREMENTAL 1 MINUTE: Performed by: SURGERY

## 2024-06-28 PROCEDURE — 3600000003 HC OR TIME - INITIAL BASE CHARGE - PROCEDURE LEVEL THREE: Performed by: SURGERY

## 2024-06-28 PROCEDURE — A19120 PR EXCISE BREAST CYST: Performed by: ANESTHESIOLOGY

## 2024-06-28 PROCEDURE — 76098 X-RAY EXAM SURGICAL SPECIMEN: CPT

## 2024-06-28 PROCEDURE — A19120 PR EXCISE BREAST CYST: Performed by: NURSE ANESTHETIST, CERTIFIED REGISTERED

## 2024-06-28 PROCEDURE — 2500000005 HC RX 250 GENERAL PHARMACY W/O HCPCS: Performed by: NURSE ANESTHETIST, CERTIFIED REGISTERED

## 2024-06-28 RX ORDER — PROPOFOL 10 MG/ML
INJECTION, EMULSION INTRAVENOUS AS NEEDED
Status: DISCONTINUED | OUTPATIENT
Start: 2024-06-28 | End: 2024-06-28

## 2024-06-28 RX ORDER — CEFAZOLIN SODIUM 2 G/100ML
2 INJECTION, SOLUTION INTRAVENOUS ONCE
Status: COMPLETED | OUTPATIENT
Start: 2024-06-28 | End: 2024-06-28

## 2024-06-28 RX ORDER — ONDANSETRON HYDROCHLORIDE 2 MG/ML
INJECTION, SOLUTION INTRAVENOUS AS NEEDED
Status: DISCONTINUED | OUTPATIENT
Start: 2024-06-28 | End: 2024-06-28

## 2024-06-28 RX ORDER — BUPIVACAINE HYDROCHLORIDE 5 MG/ML
INJECTION, SOLUTION PERINEURAL AS NEEDED
Status: DISCONTINUED | OUTPATIENT
Start: 2024-06-28 | End: 2024-06-28 | Stop reason: HOSPADM

## 2024-06-28 RX ORDER — ACETAMINOPHEN 325 MG/1
650 TABLET ORAL EVERY 6 HOURS PRN
Qty: 8 TABLET | Refills: 0 | OUTPATIENT
Start: 2024-06-28 | End: 2024-06-30

## 2024-06-28 RX ORDER — ONDANSETRON HYDROCHLORIDE 2 MG/ML
4 INJECTION, SOLUTION INTRAVENOUS ONCE AS NEEDED
Status: DISCONTINUED | OUTPATIENT
Start: 2024-06-28 | End: 2024-06-28 | Stop reason: HOSPADM

## 2024-06-28 RX ORDER — LABETALOL HYDROCHLORIDE 5 MG/ML
5 INJECTION, SOLUTION INTRAVENOUS ONCE AS NEEDED
Status: DISCONTINUED | OUTPATIENT
Start: 2024-06-28 | End: 2024-06-28 | Stop reason: HOSPADM

## 2024-06-28 RX ORDER — LIDOCAINE HCL/PF 100 MG/5ML
SYRINGE (ML) INTRAVENOUS AS NEEDED
Status: DISCONTINUED | OUTPATIENT
Start: 2024-06-28 | End: 2024-06-28

## 2024-06-28 RX ORDER — DIPHENHYDRAMINE HYDROCHLORIDE 50 MG/ML
12.5 INJECTION INTRAMUSCULAR; INTRAVENOUS ONCE AS NEEDED
Status: DISCONTINUED | OUTPATIENT
Start: 2024-06-28 | End: 2024-06-28 | Stop reason: HOSPADM

## 2024-06-28 RX ORDER — HYDRALAZINE HYDROCHLORIDE 20 MG/ML
5 INJECTION INTRAMUSCULAR; INTRAVENOUS EVERY 30 MIN PRN
Status: DISCONTINUED | OUTPATIENT
Start: 2024-06-28 | End: 2024-06-28 | Stop reason: HOSPADM

## 2024-06-28 RX ORDER — ACETAMINOPHEN 325 MG/1
650 TABLET ORAL EVERY 4 HOURS PRN
Status: DISCONTINUED | OUTPATIENT
Start: 2024-06-28 | End: 2024-06-28 | Stop reason: HOSPADM

## 2024-06-28 RX ORDER — SODIUM CHLORIDE, SODIUM LACTATE, POTASSIUM CHLORIDE, CALCIUM CHLORIDE 600; 310; 30; 20 MG/100ML; MG/100ML; MG/100ML; MG/100ML
100 INJECTION, SOLUTION INTRAVENOUS CONTINUOUS
Status: DISCONTINUED | OUTPATIENT
Start: 2024-06-28 | End: 2024-06-28 | Stop reason: HOSPADM

## 2024-06-28 RX ORDER — WATER 1 ML/ML
IRRIGANT IRRIGATION AS NEEDED
Status: DISCONTINUED | OUTPATIENT
Start: 2024-06-28 | End: 2024-06-28 | Stop reason: HOSPADM

## 2024-06-28 RX ORDER — FENTANYL CITRATE 50 UG/ML
INJECTION, SOLUTION INTRAMUSCULAR; INTRAVENOUS AS NEEDED
Status: DISCONTINUED | OUTPATIENT
Start: 2024-06-28 | End: 2024-06-28

## 2024-06-28 RX ORDER — MEPERIDINE HYDROCHLORIDE 25 MG/ML
12.5 INJECTION INTRAMUSCULAR; INTRAVENOUS; SUBCUTANEOUS EVERY 10 MIN PRN
Status: DISCONTINUED | OUTPATIENT
Start: 2024-06-28 | End: 2024-06-28 | Stop reason: HOSPADM

## 2024-06-28 SDOH — HEALTH STABILITY: MENTAL HEALTH: CURRENT SMOKER: 0

## 2024-06-28 ASSESSMENT — PAIN SCALES - GENERAL
PAINLEVEL_OUTOF10: 0 - NO PAIN
PAINLEVEL_OUTOF10: 2
PAIN_LEVEL: 0
PAINLEVEL_OUTOF10: 0 - NO PAIN
PAINLEVEL_OUTOF10: 0 - NO PAIN
PAINLEVEL_OUTOF10: 2

## 2024-06-28 ASSESSMENT — COLUMBIA-SUICIDE SEVERITY RATING SCALE - C-SSRS
5. HAVE YOU STARTED TO WORK OUT OR WORKED OUT THE DETAILS OF HOW TO KILL YOURSELF? DO YOU INTEND TO CARRY OUT THIS PLAN?: NO
2. HAVE YOU ACTUALLY HAD ANY THOUGHTS OF KILLING YOURSELF?: NO
4. HAVE YOU HAD THESE THOUGHTS AND HAD SOME INTENTION OF ACTING ON THEM?: NO
1. IN THE PAST MONTH, HAVE YOU WISHED YOU WERE DEAD OR WISHED YOU COULD GO TO SLEEP AND NOT WAKE UP?: NO
6. HAVE YOU EVER DONE ANYTHING, STARTED TO DO ANYTHING, OR PREPARED TO DO ANYTHING TO END YOUR LIFE?: NO

## 2024-06-28 ASSESSMENT — PAIN - FUNCTIONAL ASSESSMENT
PAIN_FUNCTIONAL_ASSESSMENT: 0-10
PAIN_FUNCTIONAL_ASSESSMENT: 0-10

## 2024-06-28 NOTE — ANESTHESIA POSTPROCEDURE EVALUATION
Patient: Sherin Hancock    Procedure Summary       Date: 06/28/24 Room / Location: [facility] OR 02 / Virtual [facility]    Anesthesia Start: 0800 Anesthesia Stop:     Procedure: RIGHT BREAST LESION EXCISION BY MAGSEED LOCALIZATION (Right: Breast) Diagnosis:       Mass of upper outer quadrant of right breast      (Mass of upper outer quadrant of right breast [N63.11])    Surgeons: Anurag Pedro MD Responsible Provider: Wm Crawford MD    Anesthesia Type: general ASA Status: 2            Anesthesia Type: general    Vitals Value Taken Time   /77 06/28/24 0925   Temp 36.5 °C (97.7 °F) 06/28/24 0924   Pulse 60 06/28/24 0926   Resp 16 06/28/24 0924   SpO2 100 % 06/28/24 0926   Vitals shown include unfiled device data.    Anesthesia Post Evaluation    Patient location during evaluation: PACU  Patient participation: complete - patient participated  Level of consciousness: awake and alert  Pain score: 0  Pain management: adequate  Airway patency: patent  Cardiovascular status: acceptable, blood pressure returned to baseline and hemodynamically stable  Respiratory status: acceptable and face mask  Hydration status: acceptable  Postoperative Nausea and Vomiting: none        There were no known notable events for this encounter.

## 2024-06-28 NOTE — OP NOTE
5RIGHT BREAST LESION EXCISION BY MAGSEED LOCALIZATION (R) Operative Note     Date: 2024  OR Location: [facility]    Name: Sherin Hancock, : 1945, Age: 78 y.o., MRN: [id number], Sex: female    Diagnosis  Pre-op Diagnosis     * Mass of upper outer quadrant of right breast [N63.11] Post-op Diagnosis     * Mass of upper outer quadrant of right breast [N63.11]     Procedures  RIGHT BREAST LESION EXCISION BY MAGSEED LOCALIZATION   - WV EXC CYST/ABERRANT BREAST TISSUE OPEN 1/> LESION      Surgeons      * Anurag Pedro - Primary    Resident/Fellow/Other Assistant:  Surgeons and Role:  * No surgeons found with a matching role *    Procedure Summary  Anesthesia: General  ASA: II  Anesthesia Staff: Anesthesiologist: Wm Crawford MD  CRNA: JAN Sal-CRNA  Estimated Blood Loss: 5mL  Intra-op Medications:   Administrations occurring from 0800 to 0930 on 24:   Medication Name Total Dose   sterile water irrigation solution 500 mL   lactated Ringer's infusion 187.5 mL   ceFAZolin in dextrose (iso-os) (Ancef) IVPB 2 g 2 g              Anesthesia Record               Intraprocedure I/O Totals       None           Specimen:   ID Type Source Tests Collected by Time   1 : Right Breast Magseed Localized Lumpectomy Tissue BREAST LUMPECTOMY RIGHT SURGICAL PATHOLOGY EXAM Anurag Pedro MD 2024 0854   2 : Right Breast Final Posterior Margin Tissue BREAST, EXCISION OF MASS RIGHT SURGICAL PATHOLOGY EXAM Anurag Pedro MD 2024 0855        Staff:   Anantulator: Mary  Circulator: Lelo Biggsub Person: Lo         Drains and/or Catheters: * None in log *    Tourniquet Times:         Implants:     Findings: magseed in central portion of specimen    Indications: Sherin Hancock is an 78 y.o. female who is having surgery for Mass of upper outer quadrant of right breast [N63.11].  Patient underwent screening mammograms which demonstrated microcalcifications within complex lesion within  the right breast.  Core biopsy demonstrated atypical ductal hyperplasia.  Now brought to the operating room for excision    The patient was seen in the preoperative area. The risks, benefits, complications, treatment options, non-operative alternatives, expected recovery and outcomes were discussed with the patient. The possibilities of reaction to medication, pulmonary aspiration, injury to surrounding structures, bleeding, recurrent infection, the need for additional procedures, failure to diagnose a condition, and creating a complication requiring transfusion or operation were discussed with the patient. The patient concurred with the proposed plan, giving informed consent.  The site of surgery was properly noted/marked if necessary per policy. The patient has been actively warmed in preoperative area. Preoperative antibiotics have been ordered and given within 1 hours of incision. Venous thrombosis prophylaxis have been ordered including bilateral sequential compression devices    Procedure Details: Patient was placed on the operating table.  2 phase audible timeout including participation by the patient the preinduction phase was performed in standard fashion.  All questions were answered all agreed to proceed.  Right breast was prepped and draped in a sterile fashion.  The Magseed probe identified the area of the high activity at approximately 10:00 adjacent to the nipple areolar complex.  Subsequently the incision at the nipple areolar complex was outlined using the marking pen.  The area was generously anesthetized 0.5% Marcaine plain throughout the skin and subcutaneous tissue.  Skin incision was then carried down from the dermis to subcutaneous tissue using the knife.  And cautery dissection the dissection raised the flap laterally.  With the probe continue to identify the area of increased activity this was dissected out in a circumferential fashion behind cautery dissection.  The tissue was extremely  friable.  The specimen was removed and appropriately inked.  It appeared that the Magseed was in the central portion of the specimen.  It was difficult to identify the clip.  Subsequently I did take additional posterior margin being sure to remove the entire previous biopsy cavity.  This was sent as a specimen separate specimen.  X-ray analysis of the specimen did not demonstrate any metallic findings.  There is nones of active hemorrhage at this point.  Additional 0.5% Marcaine was placed the deep confines of the wound.  Subcutaneous tissue was closed with a running closure of 3-0 Vicryl.  Skin was closed with a running subcuticular closure of 4-0 Vicryl.  Steri-Strips dressed occlusive dressing was enclosed within a Surgi-Bra.  She tolerated the procedure without complication transferred to the recovery room following termination of the procedure in stable condition.  Needle and sponge count reported to be correct  Complications:  None; patient tolerated the procedure well.    Disposition: PACU - hemodynamically stable.  Condition: stable     This procedure was not performed to treat primary cutaneous melanoma through wide local excision      Additional Details: This note was generated with voice recognition software and may contain errors, including spelling, grammar, syntax and miss recognition of what was dictated, they are not fully corrected.    Attending Attestation:     Anurag Pedro  Phone Number: [phone]

## 2024-06-28 NOTE — ANESTHESIA PROCEDURE NOTES
Airway  Date/Time: 6/28/2024 8:12 AM  Urgency: elective    Airway not difficult    Staffing  Performed: CRNA   Authorized by: Wm Crawford MD    Performed by: JAN Sal-MICHAEL  Patient location during procedure: OR    Indications and Patient Condition  Indications for airway management: anesthesia  Spontaneous Ventilation: absent  Sedation level: deep  Preoxygenated: yes  Patient position: sniffing  MILS maintained throughout  Mask difficulty assessment: 1 - vent by mask  Planned trial extubation    Final Airway Details  Final airway type: supraglottic airway      Successful airway: Supraglottic airway: iGel.  Size 4     Number of attempts at approach: 1  Ventilation between attempts: none  Number of other approaches attempted: 0    Additional Comments  Small mouth opening

## 2024-06-28 NOTE — ANESTHESIA PREPROCEDURE EVALUATION
Patient: Sherin Hancock    Procedure Information       Date/Time: 06/28/24 0800    Procedure: RIGHT BREAST LESION EXCISION BY MAGSEED LOCALIZATION (Right) - Excision right breast lesion by Maxgseed localization    Location: [facility] / [facility]    Surgeons: Anurag Pedro MD            Relevant Problems   Anesthesia (within normal limits)      Neuro   (+) Cerebral aneurysm (HHS-HCC)   (+) Radiculopathy      GI   (+) Rectal bleeding      Hematology   (+) Hypochromic microcytic anemia       Clinical information reviewed:   Tobacco  Allergies  Meds   Med Hx  Surg Hx  OB Status  Fam Hx  Soc   Hx        NPO Detail:  No data recorded     Physical Exam    Airway  Mallampati: I  TM distance: >3 FB  Neck ROM: full     Cardiovascular - normal exam  Rhythm: regular  Rate: normal     Dental - normal exam     Pulmonary - normal exam     Abdominal            Anesthesia Plan    History of general anesthesia?: yes  History of complications of general anesthesia?: no    ASA 2     general     The patient is not a current smoker.    intravenous induction   Postoperative administration of opioids is intended.  Trial extubation is planned.  Anesthetic plan and risks discussed with patient.    Plan discussed with CRNA.

## 2024-07-01 LAB
ATRIAL RATE: 62 BPM
P AXIS: 35 DEGREES
P OFFSET: 202 MS
P ONSET: 140 MS
PR INTERVAL: 162 MS
Q ONSET: 221 MS
QRS COUNT: 10 BEATS
QRS DURATION: 86 MS
QT INTERVAL: 422 MS
QTC CALCULATION(BAZETT): 428 MS
QTC FREDERICIA: 426 MS
R AXIS: 18 DEGREES
T AXIS: 39 DEGREES
T OFFSET: 432 MS
VENTRICULAR RATE: 62 BPM

## 2024-07-01 ASSESSMENT — PAIN SCALES - GENERAL: PAINLEVEL_OUTOF10: 0 - NO PAIN

## 2024-07-08 ENCOUNTER — APPOINTMENT (OUTPATIENT)
Dept: SURGERY | Facility: CLINIC | Age: 79
End: 2024-07-08
Payer: MEDICARE

## 2024-07-10 ENCOUNTER — OFFICE VISIT (OUTPATIENT)
Dept: SURGERY | Facility: CLINIC | Age: 79
End: 2024-07-10
Payer: MEDICARE

## 2024-07-10 VITALS
HEART RATE: 77 BPM | HEIGHT: 62 IN | WEIGHT: 152 LBS | OXYGEN SATURATION: 98 % | RESPIRATION RATE: 18 BRPM | SYSTOLIC BLOOD PRESSURE: 114 MMHG | BODY MASS INDEX: 27.97 KG/M2 | TEMPERATURE: 97.3 F | DIASTOLIC BLOOD PRESSURE: 78 MMHG

## 2024-07-10 DIAGNOSIS — N60.91 ATYPICAL DUCTAL HYPERPLASIA OF RIGHT BREAST: Primary | ICD-10-CM

## 2024-07-10 PROCEDURE — 1126F AMNT PAIN NOTED NONE PRSNT: CPT | Performed by: SURGERY

## 2024-07-10 PROCEDURE — 99211 OFF/OP EST MAY X REQ PHY/QHP: CPT | Performed by: SURGERY

## 2024-07-10 PROCEDURE — 1159F MED LIST DOCD IN RCRD: CPT | Performed by: SURGERY

## 2024-07-10 PROCEDURE — 1160F RVW MEDS BY RX/DR IN RCRD: CPT | Performed by: SURGERY

## 2024-07-10 PROCEDURE — 1036F TOBACCO NON-USER: CPT | Performed by: SURGERY

## 2024-07-10 ASSESSMENT — ENCOUNTER SYMPTOMS
DEPRESSION: 0
LOSS OF SENSATION IN FEET: 0
OCCASIONAL FEELINGS OF UNSTEADINESS: 0

## 2024-07-10 ASSESSMENT — COLUMBIA-SUICIDE SEVERITY RATING SCALE - C-SSRS
6. HAVE YOU EVER DONE ANYTHING, STARTED TO DO ANYTHING, OR PREPARED TO DO ANYTHING TO END YOUR LIFE?: NO
1. IN THE PAST MONTH, HAVE YOU WISHED YOU WERE DEAD OR WISHED YOU COULD GO TO SLEEP AND NOT WAKE UP?: NO
2. HAVE YOU ACTUALLY HAD ANY THOUGHTS OF KILLING YOURSELF?: NO

## 2024-07-10 ASSESSMENT — PAIN SCALES - GENERAL: PAINLEVEL: 0-NO PAIN

## 2024-07-10 NOTE — PROGRESS NOTES
Sherin Hancock underwent excision right breast mass by Magseed localization June 28 for atypical ductal hyperplasia seen on core biopsy.  Pathology is pending.  Complains of bruising over the site.  Denies chest pain shortness of breath leg pain.  General  Mental status-alert. General appearance-not in acute distress. Orientation-oriented ×4  Integumentary  Post op wound-intact.  Postsurgical change only  Pathology pending.

## 2024-07-10 NOTE — PATIENT INSTRUCTIONS
Thank you for choosing [facility].  Your surgical incision following your recent right breast excision is without signs of infection.  Swelling and bruising will resolve.  Pathology report is still pending.  We will contact you July 22 regarding your results.  The results will be immediately available for viewing on the  [facility] when completed by the pathologist.  Contact the breast clinic for any concerns

## 2024-07-15 LAB
LAB AP ASR DISCLAIMER: NORMAL
LAB AP BLOCK FOR ADDITIONAL STUDIES: NORMAL
LABORATORY COMMENT REPORT: NORMAL
PATH REPORT.ADDENDUM SPEC: NORMAL
PATH REPORT.FINAL DX SPEC: NORMAL
PATH REPORT.GROSS SPEC: NORMAL
PATH REPORT.RELEVANT HX SPEC: NORMAL
PATH REPORT.TOTAL CANCER: NORMAL
PATHOLOGY SYNOPTIC REPORT: NORMAL

## 2024-07-15 PROCEDURE — 88360 TUMOR IMMUNOHISTOCHEM/MANUAL: CPT | Performed by: PATHOLOGY

## 2024-07-15 PROCEDURE — 88342 IMHCHEM/IMCYTCHM 1ST ANTB: CPT | Performed by: PATHOLOGY

## 2024-07-15 PROCEDURE — 88341 IMHCHEM/IMCYTCHM EA ADD ANTB: CPT | Performed by: PATHOLOGY

## 2024-07-16 ENCOUNTER — DOCUMENTATION (OUTPATIENT)
Dept: SURGERY | Facility: CLINIC | Age: 79
End: 2024-07-16
Payer: MEDICARE

## 2024-07-16 NOTE — PROGRESS NOTES
Spoke with patient about her results. She understands that it has come back with DCIS. I discussed with her that I will let her know the plan after I speak with Dr. Pedro. She will call with any question's.

## 2024-07-24 ENCOUNTER — OFFICE VISIT (OUTPATIENT)
Dept: SURGERY | Facility: CLINIC | Age: 79
End: 2024-07-24
Payer: MEDICARE

## 2024-07-24 VITALS
RESPIRATION RATE: 18 BRPM | BODY MASS INDEX: 27.6 KG/M2 | HEART RATE: 73 BPM | WEIGHT: 150 LBS | DIASTOLIC BLOOD PRESSURE: 78 MMHG | TEMPERATURE: 98.2 F | SYSTOLIC BLOOD PRESSURE: 151 MMHG | OXYGEN SATURATION: 99 % | HEIGHT: 62 IN

## 2024-07-24 DIAGNOSIS — D05.11 DUCTAL CARCINOMA IN SITU (DCIS) OF RIGHT BREAST: Primary | ICD-10-CM

## 2024-07-24 DIAGNOSIS — N60.91 ATYPICAL DUCTAL HYPERPLASIA OF RIGHT BREAST: ICD-10-CM

## 2024-07-24 PROCEDURE — 99211 OFF/OP EST MAY X REQ PHY/QHP: CPT | Performed by: SURGERY

## 2024-07-24 PROCEDURE — 1126F AMNT PAIN NOTED NONE PRSNT: CPT | Performed by: SURGERY

## 2024-07-24 PROCEDURE — 1159F MED LIST DOCD IN RCRD: CPT | Performed by: SURGERY

## 2024-07-24 PROCEDURE — 1160F RVW MEDS BY RX/DR IN RCRD: CPT | Performed by: SURGERY

## 2024-07-24 PROCEDURE — 1036F TOBACCO NON-USER: CPT | Performed by: SURGERY

## 2024-07-24 ASSESSMENT — COLUMBIA-SUICIDE SEVERITY RATING SCALE - C-SSRS
1. IN THE PAST MONTH, HAVE YOU WISHED YOU WERE DEAD OR WISHED YOU COULD GO TO SLEEP AND NOT WAKE UP?: NO
6. HAVE YOU EVER DONE ANYTHING, STARTED TO DO ANYTHING, OR PREPARED TO DO ANYTHING TO END YOUR LIFE?: NO
2. HAVE YOU ACTUALLY HAD ANY THOUGHTS OF KILLING YOURSELF?: NO

## 2024-07-24 ASSESSMENT — PAIN SCALES - GENERAL: PAINLEVEL: 0-NO PAIN

## 2024-07-24 ASSESSMENT — ENCOUNTER SYMPTOMS
DEPRESSION: 0
OCCASIONAL FEELINGS OF UNSTEADINESS: 0
LOSS OF SENSATION IN FEET: 0

## 2024-07-24 NOTE — PROGRESS NOTES
Sherin Hancock underwent noted patient initially presented with architectural distortion and microcalcifications.  Stereotactic biopsy demonstrated atypical ductal hyperplasia.  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  She denies any redness swelling drainage from the area.  Currently denies chest pain shortness of breath leg pain.  General  Mental status-alert. General appearance-not in acute distress. Orientation-oriented ×4  Integumentary  Post op wound-intact. No erythema, swelling, drainage or tenderness.  I have reviewed the pathology report.  I have indicated that radiation therapy may or may not be recommended under the circumstances.  I have also discussed the potential for hormonal therapy and discussion with medical oncology.  Follow-up should the area become red warm swollen or any questions.

## 2024-07-24 NOTE — PATIENT INSTRUCTIONS
Thank you for choosing [facility].  Your surgical incision following your recent breast procedure is without signs of infection.  We have reviewed your pathology report which demonstrates a very small early breast cancer.  I recommended evaluation by both radiation therapy and medical oncology to discuss potential additional to maximize the opportunity for cure.  Please follow-up in the breast imaging should the area become red warm swollen or any concerns.  If radiation therapy is not recommended, I would obtain a 6-month mammogram of the right breast with clinical follow-up at that time.  In the event of radiation therapy, mammogram is usually done 6 months after completion of treatment.  Please contact the breast clinic for any questions regarding today's exam.  Excision of a right breast lesion by Magseed localization June 28.

## 2024-07-31 ENCOUNTER — HOSPITAL ENCOUNTER (OUTPATIENT)
Dept: RADIATION ONCOLOGY | Facility: CLINIC | Age: 79
Setting detail: RADIATION/ONCOLOGY SERIES
Discharge: HOME | End: 2024-07-31
Payer: MEDICARE

## 2024-07-31 VITALS
DIASTOLIC BLOOD PRESSURE: 80 MMHG | BODY MASS INDEX: 27.69 KG/M2 | OXYGEN SATURATION: 95 % | RESPIRATION RATE: 18 BRPM | TEMPERATURE: 98.2 F | SYSTOLIC BLOOD PRESSURE: 152 MMHG | WEIGHT: 151.4 LBS | HEART RATE: 67 BPM

## 2024-07-31 DIAGNOSIS — N60.91 ATYPICAL DUCTAL HYPERPLASIA OF RIGHT BREAST: ICD-10-CM

## 2024-07-31 DIAGNOSIS — D05.11 DUCTAL CARCINOMA IN SITU (DCIS) OF RIGHT BREAST: Primary | ICD-10-CM

## 2024-07-31 PROCEDURE — 99205 OFFICE O/P NEW HI 60 MIN: CPT | Performed by: STUDENT IN AN ORGANIZED HEALTH CARE EDUCATION/TRAINING PROGRAM

## 2024-07-31 PROCEDURE — 99215 OFFICE O/P EST HI 40 MIN: CPT | Performed by: STUDENT IN AN ORGANIZED HEALTH CARE EDUCATION/TRAINING PROGRAM

## 2024-07-31 PROCEDURE — G2211 COMPLEX E/M VISIT ADD ON: HCPCS | Performed by: STUDENT IN AN ORGANIZED HEALTH CARE EDUCATION/TRAINING PROGRAM

## 2024-07-31 ASSESSMENT — ENCOUNTER SYMPTOMS
ENDOCRINE NEGATIVE: 1
GASTROINTESTINAL NEGATIVE: 1
RESPIRATORY NEGATIVE: 1
CONSTITUTIONAL NEGATIVE: 1
PSYCHIATRIC NEGATIVE: 1
EYES NEGATIVE: 1
ARTHRALGIAS: 1
HEMATOLOGIC/LYMPHATIC NEGATIVE: 1
CARDIOVASCULAR NEGATIVE: 1
NEUROLOGICAL NEGATIVE: 1

## 2024-07-31 ASSESSMENT — PAIN SCALES - GENERAL: PAINLEVEL: 0-NO PAIN

## 2024-07-31 NOTE — PROGRESS NOTES
Radiation Oncology Outpatient Consult    Patient Name:  Sherin Hancock  MRN:  [id number]  :  1945    Referring Provider: Anurag Pedro MD  Primary Care Provider: Brent Bell MD  Care Team: Patient Care Team:  Brent Bell MD as PCP - General    Date of Service: 2024     SUBJECTIVE  History of Present Illness:  Sherin Hancock is a 78 y.o. 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 Anurag Pedro for evaluation and management.  She has a history of ulcerative colitis and does not require treatment.  She presented for routine screening mammogram on 2024 which showed a right breast asymmetry and microcalcifications.  Diagnostic right breast mammogram showed persistent architectural distortion in the right breast, biopsy was recommended.  Stereotactic core biopsy of right breast calcifications on 2024 showed atypical ductal hyperplasia arising in a complex sclerosing lesion.  She was recommended Magseed localized lumpectomy.  She underwent right breast lumpectomy on 2024, pathology showing 4.5 mm of intermediate grade DCIS, comedonecrosis present.  All margins negative by greater than 2 mm.  ER positive greater than 95%.  She was referred to radiation oncology and medical oncology for adjuvant treatment discussions.  She states she has recovered well from surgery without residual breast pains or swelling.  She is very active without range of motion limitations at the shoulder. She is otherwise in excellent health.     age 18 first birth. Menarche 16, menopause in her early 40s.  Brief history of HRT use.  History of remote left breast excision which was presumably benign (patient never heard about results).    Past Medical History:    Past Medical History:   Diagnosis Date    Breast mass Current    Encounter for screening for malignant neoplasm of vagina     Vaginal Pap smear    Hyperlipidemia     Hypertension      Hypothyroidism     Personal history of diseases of the skin and subcutaneous tissue     History of sebaceous cyst    Personal history of other diseases of the female genital tract     History of vaginal discharge    Personal history of other medical treatment     H/O mammogram    Personal history of other medical treatment     H/O bone density study    Ulcerative colitis (Multi)         Past Surgical History:    Past Surgical History:   Procedure Laterality Date    BREAST BIOPSY  May 14, 2024    BREAST LUMPECTOMY  1990's    Left Breast Excisional Biopsy    CEREBRAL ANEURYSM REPAIR  2023    TUBAL LIGATION  07/17/2015    Tubal Ligation        Family History:  Cancer-related family history includes Breast cancer in her father's sister; Breast cancer (age of onset: 40 - 49) in her mother's sister; Breast cancer (age of onset: 50 - 59) in an other family member; Cancer in her mother; Endometrial cancer in her father's sister.    Social History:    Social History     Tobacco Use    Smoking status: Never    Smokeless tobacco: Never   Vaping Use    Vaping status: Never Used   Substance Use Topics    Alcohol use: Never    Drug use: Never       Allergies:    Allergies   Allergen Reactions    Sulfasalazine Rash     hives        Medications:    Current Outpatient Medications:     acetaminophen (Tylenol) 500 mg tablet, Take 2 tablets (1,000 mg) by mouth every 6 hours if needed for mild pain (1 - 3)., Disp: , Rfl:     amLODIPine (Norvasc) 5 mg tablet, Take 1 tablet (5 mg) by mouth once daily., Disp: , Rfl:     aspirin 81 mg chewable tablet, Chew 1 tablet (81 mg) once daily. CHEW AND SWALLOW. PLEASE START ONE WEEK BEFORE ANGIOGRAM PROCEDURE, Disp: , Rfl:     atorvastatin (Lipitor) 20 mg tablet, Take 1 tablet (20 mg) by mouth once daily., Disp: , Rfl:     Synthroid 88 mcg tablet, Take 1 tablet (88 mcg) by mouth once daily in the morning. Take before meals., Disp: , Rfl:     mesalamine (Rowasa) 4 gram/60 mL enema, Insert 60 mL (4 g)  into the rectum once daily at bedtime for 21 days. Use as directed (Patient not taking: Reported on 6/27/2024), Disp: 1260 mL, Rfl: 0      Review of Systems:  Review of Systems - Oncology - please see nursing note    Performance Status:  The Karnofsky performance scale today is 90, Able to carry on normal activity; minor signs or symptoms of disease (ECOG equivalent 0).        OBJECTIVE  /80   Pulse 67   Temp 36.8 °C (98.2 °F)   Resp 18   Wt 68.7 kg (151 lb 6.4 oz)   SpO2 95%   BMI 27.69 kg/m²    Physical Exam  Vitals reviewed.   Constitutional:       General: She is not in acute distress.  HENT:      Head: Normocephalic and atraumatic.   Cardiovascular:      Rate and Rhythm: Normal rate and regular rhythm.   Pulmonary:      Effort: Pulmonary effort is normal. No respiratory distress.      Breath sounds: No wheezing.   Chest:      Comments:   Right breast partial mastectomy scar well-healed, mild underlying seroma.  Left breast with remote excision scar.  No palpable masses in the right or left breast.  Lymphadenopathy:      Upper Body:      Right upper body: No axillary adenopathy.      Left upper body: No axillary adenopathy.   Skin:     General: Skin is warm and dry.   Psychiatric:         Mood and Affect: Mood normal.         Behavior: Behavior normal.       Pathology Review:  The pertinent pathology results were reviewed and discussed with the patient.     Imaging:  The pertinent imaging results were reviewed and discussed with the patient.        ASSESSMENT:  Sherin Hancock is a 78 y.o. 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    PLAN:  The rationale, risks, benefits and alternatives of radiation therapy as a component of breast conserving surgery were reviewed with the patient.   Adjuvant radiotherapy  can reduce the risk of  ipsilateral breast recurrence by 1/2 for patients with DCIS after lumpectomy. Her DCIS has multiple favorable features including size, margin status, and ER-positivity. She has excellent performance status, and so I believe adjuvant radiotherapy should be considered in her case. We discussed that if she opted for treatment, I would recommend partial breast radiotherapy over 5 sessions. The side effects of radiotherapy to the right breast include but are not limited to skin irritation with possible breakdown, fatigue, possible poor cosmetic outcome, rib fragility, pulmonary toxicity, lymphedema, and the possibility of a second malignancy induced by the radiation. She voices understanding and wishes to proceed with partial breast radiotherapy. Informed consent was signed. CT simulation will be scheduled.     NCCN Guidelines were applicable to guide this patients treatment plan. Effort is required for continued longitudinal patient care.    Thank you for allowing me to participate in this patient's care.    David Isaac MD  Department of Radiation Oncology  [facility]    Portions of this note were generated using voice recognition software, and may be subject to transcription errors.

## 2024-07-31 NOTE — PROGRESS NOTES
Radiation Oncology Nursing Note    Prior Radiotherapy:  No  No radiation treatments to show. (Treatments may have been administered in another system.)     Current Systemic Treatment:  No     Presence of Pacemaker or ICD:  No    History of Autoimmune or Connective Tissue Disorders:  Yes, describe: ulcerative colitis    Pain: The patient's current pain level was assessed.  They report currently having a pain of 0 out of 10.  They feel their pain is under control without the use of pain medications.    Review of Systems:  Review of Systems   Constitutional: Negative.    HENT:  Negative.     Eyes: Negative.    Respiratory: Negative.     Cardiovascular: Negative.    Gastrointestinal: Negative.    Endocrine: Negative.    Genitourinary: Negative.     Musculoskeletal:  Positive for arthralgias.   Skin: Negative.    Neurological: Negative.    Hematological: Negative.    Psychiatric/Behavioral: Negative.

## 2024-08-05 ENCOUNTER — OFFICE VISIT (OUTPATIENT)
Dept: HEMATOLOGY/ONCOLOGY | Facility: CLINIC | Age: 79
End: 2024-08-05
Payer: MEDICARE

## 2024-08-05 VITALS
RESPIRATION RATE: 18 BRPM | TEMPERATURE: 97 F | HEART RATE: 80 BPM | HEIGHT: 62 IN | BODY MASS INDEX: 27.63 KG/M2 | SYSTOLIC BLOOD PRESSURE: 150 MMHG | DIASTOLIC BLOOD PRESSURE: 80 MMHG | WEIGHT: 150.13 LBS | OXYGEN SATURATION: 99 %

## 2024-08-05 DIAGNOSIS — D05.11 DUCTAL CARCINOMA IN SITU (DCIS) OF RIGHT BREAST: ICD-10-CM

## 2024-08-05 DIAGNOSIS — N60.91 ATYPICAL DUCTAL HYPERPLASIA OF RIGHT BREAST: ICD-10-CM

## 2024-08-05 PROCEDURE — 1159F MED LIST DOCD IN RCRD: CPT | Performed by: INTERNAL MEDICINE

## 2024-08-05 PROCEDURE — 99215 OFFICE O/P EST HI 40 MIN: CPT | Performed by: INTERNAL MEDICINE

## 2024-08-05 PROCEDURE — 1126F AMNT PAIN NOTED NONE PRSNT: CPT | Performed by: INTERNAL MEDICINE

## 2024-08-05 PROCEDURE — 1036F TOBACCO NON-USER: CPT | Performed by: INTERNAL MEDICINE

## 2024-08-05 RX ORDER — ANASTROZOLE 1 MG/1
1 TABLET ORAL DAILY
Qty: 30 TABLET | Refills: 5 | Status: SHIPPED | OUTPATIENT
Start: 2024-08-05 | End: 2024-09-04

## 2024-08-05 ASSESSMENT — PATIENT HEALTH QUESTIONNAIRE - PHQ9
1. LITTLE INTEREST OR PLEASURE IN DOING THINGS: NOT AT ALL
2. FEELING DOWN, DEPRESSED OR HOPELESS: NOT AT ALL
SUM OF ALL RESPONSES TO PHQ9 QUESTIONS 1 AND 2: 0

## 2024-08-05 ASSESSMENT — COLUMBIA-SUICIDE SEVERITY RATING SCALE - C-SSRS
1. IN THE PAST MONTH, HAVE YOU WISHED YOU WERE DEAD OR WISHED YOU COULD GO TO SLEEP AND NOT WAKE UP?: NO
2. HAVE YOU ACTUALLY HAD ANY THOUGHTS OF KILLING YOURSELF?: NO
6. HAVE YOU EVER DONE ANYTHING, STARTED TO DO ANYTHING, OR PREPARED TO DO ANYTHING TO END YOUR LIFE?: NO

## 2024-08-05 ASSESSMENT — PAIN SCALES - GENERAL: PAINLEVEL: 0-NO PAIN

## 2024-08-05 NOTE — PROGRESS NOTES
"Patient ID: Sherin Hancock is a 78 y.o. female.  Referring Physician: Anurag Pedro MD  [address] [place] [address]  Primary Care Provider: Brent Bell MD   6/28/2024  FINAL DIAGNOSIS   A. Right breast, magseed localized lumpectomy:  -- Ductal carcinoma in situ, see synoptic report and note.  -- Background of complex sclerosing lesion.  -- Previous biopsy site changes     Note: The interpretation and the staging will be based on the current specimen and the previous biopsy ([id number]). Immunostains for p63, SMMHC, CK5/6 and ER were reviewed on A6.     B. Right breast, posterior margin, excision:  -- Negative for carcinoma.  -- Usual ductal hyperplasia.  -- Sclerosing adenosis.  -- Previous biopsy site changes.     : Dr. Momo Chan (part A and selected slides from [id number]).   Electronically signed by Annette Dsouza MD on 7/15/2024 at 1536      [facility]   By the signature on this report, the individual or group listed as making the Final Interpretation/Diagnosis certifies that they have reviewed this case.    Case Summary Report   DCIS OF THE BREAST: Resection   8th Edition - Protocol posted: 3/23/2022DCIS OF THE BREAST: RESECTION - All Specimens  SPECIMEN   Procedure  Excision (less than total mastectomy)   Specimen Laterality  Right   TUMOR   Tumor Site  Not specified   Histologic Type  Ductal carcinoma in situ   Size (Extent) of DCIS  Estimated size (extent) of DCIS is at least (Millimeters): 4.5 mm   Number of Blocks with DCIS  1   Number of Blocks Examined  20   Architectural Patterns  Cribriform     Solid   Nuclear Grade  Grade II (intermediate)   Necrosis  Present, central (expansive \"comedo\" necrosis)   Microcalcifications  Not identified   MARGINS   Margin Status  All margins negative for DCIS   Distance from DCIS to Closest Margin  Greater than: 2 mm   Margin Comment  Margin assessment includes the final posterior margin (specimen B)   REGIONAL LYMPH NODES "   Regional Lymph Node Status  Not applicable (no regional lymph nodes submitted or found)   PATHOLOGIC STAGE CLASSIFICATION (pTNM, AJCC 8th Edition)   Reporting of pT, pN, and (when applicable) pM categories is based on information available to the pathologist at the time the report is issued. As per the AJCC (Chapter 1, 8th Ed.) it is the managing physician’s responsibility to establish the final pathologic stage based upon all pertinent information, including but potentially not limited to this pathology report.   pT Category  pTis (DCIS)   pN Category  pN not assigned (no nodes submitted or found)   Breast Biomarker Testing Performed on Previous Biopsy     Estrogen Receptor (ER) Status  Positive   Percentage of Cells with Nuclear Positivity  >95 %   Testing Performed on Case Number  [id number] [id number]   .      Block for Additional Biomarkers/Molecular Studies  [facility]   Normal Block: A1  Tumor Block: A6, [id number] A4,A6   Addendum   Surgical/Block Number:[id number] A6  Specimen Site:Right breast  Specimen Type:lumpectomy     Estrogen Receptor (clone SP1):                     Positive                                                                           Percentage with nuclear staining: >95%                                                                          Intensity of staining: Strong     Comment:   Internal positive controls were identified in this specimen.   ASCO/CAP guidelines for ischemic times are met for this sample.   Analysis was performed on Ductal Carcinoma in Situ.     For ER: Ranges for interpretation: Invasive carcinoma cells exhibiting greater than or equal to 10% nuclear staining are considered POSITIVE. Invasive carcinoma cells exhibiting less than 10%, but greater than or equal to 1% are considered LOW POSITIVE. Invasive carcinoma cells exhibiting less than 1% staining are considered NEGATIVE. (Reference: Arch Pathol Lab Med. doi:10.5858/arpa. [id number]) The stated steroid receptor activity  for ER was derived from rabbit monoclonal antibody staining (clone SP1) on formalin fixed, paraffin embedded specimens, unless otherwise noted.  Each assay is performed using appropriate positive and negative internal controls.    Addendum electronically signed by Annette Dsouza MD on 7/15/2024 at 1537   Clinical History  [facility]   Pre-op diagnosis:  Mass of upper outer quadrant of right breast [N63.11]     INKED AS: GREEN ANTERIOR, BLUE INFERIOR, ORANGE LATERAL, YELLOW MEDIAL, BLACK POSTERIOR, RED SUPERIOR       Subjective    HPI  The patient is a 78-year-old woman ER positive DCIS of upper outer quadrant right breast.  The patient has a history of ulcerative colitis currently in remission not on any medications.  Routine screening mammogram on May 9, 2024 revealed a right breast asymmetry and microcalcifications.  Diagnostic right mammogram revealed persistent architectural distortion in the right breast, biopsy was recommended.  Stereotactic core biopsy of the right breast on May 20, 2024 revealed atypical ductal hyperplasia arising in a complex sclerosing lesion.  The patient underwent right breast lumpectomy on June 28, 2024.  Pathology revealed 4.5 mm intermediate grade DCIS, with comedonecrosis.  All margins negative by greater than 2 mm.  ER positive greater than 95%.  The patient was referred to radiation oncology for further evaluation and management.  The patient is being considered for abbreviated course of radiation therapy in the next 2 to 3 weeks.  Referred for further evaluation and management.    At interview on August 5, 2024 the patient narrated entire history.  Denied history of fevers, night sweats, weight loss, nausea, vomiting, hematemesis, melena, hematochezia and hematuria.  Past medical history DCIS ER positive upper outer quadrant right breast, hyperlipidemia, hypertension, hypothyroidism, history of sebaceous cyst, history of vaginal discharge, past history of ulcerative colitis now in  "remission not receiving any medications.    Past surgical history:    Provide breast biopsy May 14, 2024, lumpectomy , cerebral aneurysm repair , tubal ligation 2015.    Family history:    Breast cancer in father sister age of onset 40-49, in her mother sister age 50-59, cancer in her mother endometrial cancer in father's sister.    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  Denies history of smoking alcohol abuse drug abuse.     3 para 3, age 18 first birth.  Menarche at 16, menopause in early 40s brief history of HRT use.  History of remote left breast excision which was presumably benign.  Review of Systems   All other systems reviewed and are negative.       Objective   BSA: 1.73 meters squared  /80   Pulse 80   Temp 36.1 °C (97 °F)   Resp 18   Ht 1.585 m (5' 2.4\")   Wt 68.1 kg (150 lb 2.1 oz)   SpO2 99%   BMI 27.11 kg/m²     Family History   Problem Relation Name Age of Onset    Hypertension Mother L. Alon     Cancer Mother L. Kaileeout     Arthritis Mother L. Kaileeout     Hypertension Father Luis F Chi     Other (bladder cancer) Father Luis F Chi         age 64 upon passing    COPD Father Luis F Chi     Arthritis Sister FRANCOISE Leon     Breast cancer Mother's Sister ADRYAN Cunha 40 - 49    Breast cancer Father's Sister      Endometrial cancer Father's Sister      Breast cancer Other Father's sister 50 - 59     Oncology History    No history exists.       Sherin VALDIVIA Karissa  reports that she has never smoked. She has never used smokeless tobacco.  She  reports no history of alcohol use.  She  reports no history of drug use.    Physical Exam  Constitutional:       Appearance: Normal appearance.   HENT:      Head: Normocephalic and atraumatic.      Nose: Nose normal.      Mouth/Throat:      Mouth: Mucous membranes are moist.      Pharynx: Oropharynx is clear.   Eyes:      Extraocular Movements: Extraocular movements intact.      Conjunctiva/sclera: " Conjunctivae normal.      Pupils: Pupils are equal, round, and reactive to light.   Cardiovascular:      Rate and Rhythm: Normal rate and regular rhythm.   Pulmonary:      Effort: Pulmonary effort is normal.      Breath sounds: Normal breath sounds.   Abdominal:      General: Abdomen is flat. Bowel sounds are normal.      Palpations: Abdomen is soft.   Musculoskeletal:         General: Normal range of motion.      Cervical back: Normal range of motion and neck supple.   Neurological:      General: No focal deficit present.      Mental Status: She is alert and oriented to person, place, and time. Mental status is at baseline.   Psychiatric:         Mood and Affect: Mood normal.         Behavior: Behavior normal.         Thought Content: Thought content normal.         Judgment: Judgment normal.       A well-healed scar upper outer quadrant right breast with surgery related changes and possible seroma.  Performance Status:  Asymptomatic    Assessment/Plan    The patient is a 78-year-old woman presenting with new onset microcalcifications on the mammogram on May 9, 2024.  Initially a biopsy followed by lumpectomy revealed DCIS upper outer quadrant right breast ER positive.  Physical examination revealed a well-healed scar upper outer quadrant right breast.  Patient was evaluated by radiation oncology services.  She is being considered for abbreviated course of radiation therapy within the next 2 to 3 weeks.  I had a detailed discussion with the patient explained to her about hormonal manipulation and if agreeable would recommend Arimidex 1 mg p.o. daily to begin after completion of radiation therapy.  Prescription sent.  Printed material from [facility] provided.  The patient understood appreciated all the details provided and was grateful and agreeable.  Return in 8 weeks.    Thank you for allowing me to participate in care of your patient.  Any questions please feel free to call me.      Diagnoses and all orders for this  visit:  Atypical ductal hyperplasia of right breast  -     Referral to Hematology and Oncology  -     anastrozole (Arimidex) 1 mg tablet; Take 1 tablet (1 mg total) by mouth once daily.  Swallow whole with a drink of water.           Low Ragland MD

## 2024-08-06 ENCOUNTER — HOSPITAL ENCOUNTER (OUTPATIENT)
Dept: RADIOLOGY | Facility: CLINIC | Age: 79
Discharge: HOME | End: 2024-08-06
Payer: MEDICARE

## 2024-08-06 DIAGNOSIS — D05.11 DUCTAL CARCINOMA IN SITU (DCIS) OF RIGHT BREAST: ICD-10-CM

## 2024-08-06 PROCEDURE — 77334 RADIATION TREATMENT AID(S): CPT | Performed by: STUDENT IN AN ORGANIZED HEALTH CARE EDUCATION/TRAINING PROGRAM

## 2024-08-08 ENCOUNTER — HOSPITAL ENCOUNTER (OUTPATIENT)
Dept: RADIATION ONCOLOGY | Facility: CLINIC | Age: 79
Setting detail: RADIATION/ONCOLOGY SERIES
Discharge: HOME | End: 2024-08-08
Payer: MEDICARE

## 2024-08-08 PROCEDURE — 77300 RADIATION THERAPY DOSE PLAN: CPT | Performed by: STUDENT IN AN ORGANIZED HEALTH CARE EDUCATION/TRAINING PROGRAM

## 2024-08-08 PROCEDURE — 77301 RADIOTHERAPY DOSE PLAN IMRT: CPT | Performed by: STUDENT IN AN ORGANIZED HEALTH CARE EDUCATION/TRAINING PROGRAM

## 2024-08-08 PROCEDURE — 77338 DESIGN MLC DEVICE FOR IMRT: CPT | Performed by: STUDENT IN AN ORGANIZED HEALTH CARE EDUCATION/TRAINING PROGRAM

## 2024-08-12 ENCOUNTER — APPOINTMENT (OUTPATIENT)
Dept: HEMATOLOGY/ONCOLOGY | Facility: HOSPITAL | Age: 79
End: 2024-08-12
Payer: MEDICARE

## 2024-08-14 ENCOUNTER — APPOINTMENT (OUTPATIENT)
Dept: RADIATION ONCOLOGY | Facility: CLINIC | Age: 79
End: 2024-08-14
Payer: MEDICARE

## 2024-08-16 ENCOUNTER — APPOINTMENT (OUTPATIENT)
Dept: RADIATION ONCOLOGY | Facility: CLINIC | Age: 79
End: 2024-08-16
Payer: MEDICARE

## 2024-08-19 ENCOUNTER — HOSPITAL ENCOUNTER (OUTPATIENT)
Dept: RADIATION ONCOLOGY | Facility: CLINIC | Age: 79
Setting detail: RADIATION/ONCOLOGY SERIES
Discharge: HOME | End: 2024-08-19
Payer: MEDICARE

## 2024-08-19 DIAGNOSIS — D05.11 INTRADUCTAL CARCINOMA IN SITU OF RIGHT BREAST: ICD-10-CM

## 2024-08-19 LAB
RAD ONC MSQ ACTUAL FRACTIONS DELIVERED: 1
RAD ONC MSQ ACTUAL SESSION DELIVERED DOSE: 600 CGRAY
RAD ONC MSQ ACTUAL TOTAL DOSE: 600 CGRAY
RAD ONC MSQ ELAPSED DAYS: 0
RAD ONC MSQ LAST DATE: NORMAL
RAD ONC MSQ PRESCRIBED FRACTIONAL DOSE: 600 CGRAY
RAD ONC MSQ PRESCRIBED NUMBER OF FRACTIONS: 5
RAD ONC MSQ PRESCRIBED TECHNIQUE: NORMAL
RAD ONC MSQ PRESCRIBED TOTAL DOSE: 3000 CGRAY
RAD ONC MSQ PRESCRIPTION PATTERN COMMENT: NORMAL
RAD ONC MSQ START DATE: NORMAL
RAD ONC MSQ TREATMENT COURSE NUMBER: 1
RAD ONC MSQ TREATMENT SITE: NORMAL

## 2024-08-19 PROCEDURE — 77336 RADIATION PHYSICS CONSULT: CPT | Performed by: STUDENT IN AN ORGANIZED HEALTH CARE EDUCATION/TRAINING PROGRAM

## 2024-08-19 PROCEDURE — 77386 HC INTENSITY-MODULATED RADIATION THERAPY (IMRT), COMPLEX: CPT | Performed by: STUDENT IN AN ORGANIZED HEALTH CARE EDUCATION/TRAINING PROGRAM

## 2024-08-21 ENCOUNTER — RADIATION ONCOLOGY OTV (OUTPATIENT)
Dept: RADIATION ONCOLOGY | Facility: CLINIC | Age: 79
End: 2024-08-21
Payer: MEDICARE

## 2024-08-21 ENCOUNTER — HOSPITAL ENCOUNTER (OUTPATIENT)
Dept: RADIATION ONCOLOGY | Facility: CLINIC | Age: 79
Setting detail: RADIATION/ONCOLOGY SERIES
Discharge: HOME | End: 2024-08-21
Payer: MEDICARE

## 2024-08-21 VITALS
RESPIRATION RATE: 18 BRPM | HEART RATE: 58 BPM | DIASTOLIC BLOOD PRESSURE: 82 MMHG | TEMPERATURE: 97.9 F | BODY MASS INDEX: 27.01 KG/M2 | SYSTOLIC BLOOD PRESSURE: 144 MMHG | OXYGEN SATURATION: 97 % | WEIGHT: 149.6 LBS

## 2024-08-21 DIAGNOSIS — D05.11 INTRADUCTAL CARCINOMA IN SITU OF RIGHT BREAST: ICD-10-CM

## 2024-08-21 DIAGNOSIS — Z51.0 ENCOUNTER FOR ANTINEOPLASTIC RADIATION THERAPY: ICD-10-CM

## 2024-08-21 LAB
RAD ONC MSQ ACTUAL FRACTIONS DELIVERED: 2
RAD ONC MSQ ACTUAL SESSION DELIVERED DOSE: 600 CGRAY
RAD ONC MSQ ACTUAL TOTAL DOSE: 1200 CGRAY
RAD ONC MSQ ELAPSED DAYS: 2
RAD ONC MSQ LAST DATE: NORMAL
RAD ONC MSQ PRESCRIBED FRACTIONAL DOSE: 600 CGRAY
RAD ONC MSQ PRESCRIBED NUMBER OF FRACTIONS: 5
RAD ONC MSQ PRESCRIBED TECHNIQUE: NORMAL
RAD ONC MSQ PRESCRIBED TOTAL DOSE: 3000 CGRAY
RAD ONC MSQ PRESCRIPTION PATTERN COMMENT: NORMAL
RAD ONC MSQ START DATE: NORMAL
RAD ONC MSQ TREATMENT COURSE NUMBER: 1
RAD ONC MSQ TREATMENT SITE: NORMAL

## 2024-08-21 PROCEDURE — 77386 HC INTENSITY-MODULATED RADIATION THERAPY (IMRT), COMPLEX: CPT | Performed by: STUDENT IN AN ORGANIZED HEALTH CARE EDUCATION/TRAINING PROGRAM

## 2024-08-21 ASSESSMENT — PAIN SCALES - GENERAL: PAINLEVEL: 0-NO PAIN

## 2024-08-21 NOTE — PROGRESS NOTES
Radiation Oncology On Treatment Visit    Patient Name:  Sherin Hancock  MRN:  [id number]  :  1945    Referring Provider: No ref. provider found  Primary Care Provider: Brent Bell MD  Care Team: Patient Care Team:  Brent Bell MD as PCP - General  Low Ragland MD as Consulting Physician (Hematology and Oncology)    Date of Service: 2024     Diagnosis:   Specialty Problems    None    Treatment Summary:  3D CRT: Right Breast    Treatment Period Technique Fraction Dose Fractions Total Dose   Course 1 2024-2024  (days elapsed: 2)         R_PartBreast 2024-2024 VMAT 600 / 600 cGy  1200 / 3,000 cGy     SUBJECTIVE:   Tolerating treatment without skin changes or pain.      OBJECTIVE:   Vital Signs:  /82   Pulse 58   Temp 36.6 °C (97.9 °F)   Resp 18   Wt 67.9 kg (149 lb 9.6 oz)   SpO2 97%   BMI 27.01 kg/m²    Pain Scale: The patient's current pain level was assessed.  They report currently having a pain of 0 out of 10.    Other Pertinent Findings:     Faint grade 1 dermatitis over the treated breast without desquamation  Toxicity Assessment          2024    15:09   Toxicity Assessment   Adverse Events Reviewed (WDL) No (Exceptions to WDL)   Treatment Site Breast   Dermatitis Radiation Grade 0       skin cream given        Assessment / Plan:  The patient is tolerating radiation therapy as anticipated.  Continue per current treatment plan.    Follow-up in 3 months.    Thank you for allowing me to participate in this patient's care.    David Isaac MD  Department of Radiation Oncology  [facility]    Portions of this note were generated using voice recognition software, and may be subject to transcription errors.

## 2024-08-23 ENCOUNTER — HOSPITAL ENCOUNTER (OUTPATIENT)
Dept: RADIATION ONCOLOGY | Facility: CLINIC | Age: 79
Setting detail: RADIATION/ONCOLOGY SERIES
Discharge: HOME | End: 2024-08-23
Payer: MEDICARE

## 2024-08-23 LAB
RAD ONC MSQ ACTUAL FRACTIONS DELIVERED: 3
RAD ONC MSQ ACTUAL SESSION DELIVERED DOSE: 600 CGRAY
RAD ONC MSQ ACTUAL TOTAL DOSE: 1800 CGRAY
RAD ONC MSQ ELAPSED DAYS: 4
RAD ONC MSQ LAST DATE: NORMAL
RAD ONC MSQ PRESCRIBED FRACTIONAL DOSE: 600 CGRAY
RAD ONC MSQ PRESCRIBED NUMBER OF FRACTIONS: 5
RAD ONC MSQ PRESCRIBED TECHNIQUE: NORMAL
RAD ONC MSQ PRESCRIBED TOTAL DOSE: 3000 CGRAY
RAD ONC MSQ PRESCRIPTION PATTERN COMMENT: NORMAL
RAD ONC MSQ START DATE: NORMAL
RAD ONC MSQ TREATMENT COURSE NUMBER: 1
RAD ONC MSQ TREATMENT SITE: NORMAL

## 2024-08-26 ENCOUNTER — HOSPITAL ENCOUNTER (OUTPATIENT)
Dept: RADIATION ONCOLOGY | Facility: CLINIC | Age: 79
Setting detail: RADIATION/ONCOLOGY SERIES
Discharge: HOME | End: 2024-08-26
Payer: MEDICARE

## 2024-08-26 DIAGNOSIS — Z51.0 ENCOUNTER FOR ANTINEOPLASTIC RADIATION THERAPY: ICD-10-CM

## 2024-08-26 DIAGNOSIS — D05.11 INTRADUCTAL CARCINOMA IN SITU OF RIGHT BREAST: ICD-10-CM

## 2024-08-26 LAB
RAD ONC MSQ ACTUAL FRACTIONS DELIVERED: 4
RAD ONC MSQ ACTUAL SESSION DELIVERED DOSE: 600 CGRAY
RAD ONC MSQ ACTUAL TOTAL DOSE: 2400 CGRAY
RAD ONC MSQ ELAPSED DAYS: 7
RAD ONC MSQ LAST DATE: NORMAL
RAD ONC MSQ PRESCRIBED FRACTIONAL DOSE: 600 CGRAY
RAD ONC MSQ PRESCRIBED NUMBER OF FRACTIONS: 5
RAD ONC MSQ PRESCRIBED TECHNIQUE: NORMAL
RAD ONC MSQ PRESCRIBED TOTAL DOSE: 3000 CGRAY
RAD ONC MSQ PRESCRIPTION PATTERN COMMENT: NORMAL
RAD ONC MSQ START DATE: NORMAL
RAD ONC MSQ TREATMENT COURSE NUMBER: 1
RAD ONC MSQ TREATMENT SITE: NORMAL

## 2024-08-26 PROCEDURE — 77386 HC INTENSITY-MODULATED RADIATION THERAPY (IMRT), COMPLEX: CPT | Performed by: STUDENT IN AN ORGANIZED HEALTH CARE EDUCATION/TRAINING PROGRAM

## 2024-08-28 ENCOUNTER — HOSPITAL ENCOUNTER (OUTPATIENT)
Dept: RADIATION ONCOLOGY | Facility: CLINIC | Age: 79
Setting detail: RADIATION/ONCOLOGY SERIES
Discharge: HOME | End: 2024-08-28
Payer: MEDICARE

## 2024-08-28 ENCOUNTER — DOCUMENTATION (OUTPATIENT)
Dept: RADIATION ONCOLOGY | Facility: CLINIC | Age: 79
End: 2024-08-28

## 2024-08-28 DIAGNOSIS — D05.11 INTRADUCTAL CARCINOMA IN SITU OF RIGHT BREAST: ICD-10-CM

## 2024-08-28 DIAGNOSIS — Z51.0 ENCOUNTER FOR ANTINEOPLASTIC RADIATION THERAPY: ICD-10-CM

## 2024-08-28 LAB
RAD ONC MSQ ACTUAL FRACTIONS DELIVERED: 5
RAD ONC MSQ ACTUAL SESSION DELIVERED DOSE: 600 CGRAY
RAD ONC MSQ ACTUAL TOTAL DOSE: 3000 CGRAY
RAD ONC MSQ ELAPSED DAYS: 9
RAD ONC MSQ LAST DATE: NORMAL
RAD ONC MSQ PRESCRIBED FRACTIONAL DOSE: 600 CGRAY
RAD ONC MSQ PRESCRIBED NUMBER OF FRACTIONS: 5
RAD ONC MSQ PRESCRIBED TECHNIQUE: NORMAL
RAD ONC MSQ PRESCRIBED TOTAL DOSE: 3000 CGRAY
RAD ONC MSQ PRESCRIPTION PATTERN COMMENT: NORMAL
RAD ONC MSQ START DATE: NORMAL
RAD ONC MSQ TREATMENT COURSE NUMBER: 1
RAD ONC MSQ TREATMENT SITE: NORMAL

## 2024-08-28 PROCEDURE — 77336 RADIATION PHYSICS CONSULT: CPT | Performed by: STUDENT IN AN ORGANIZED HEALTH CARE EDUCATION/TRAINING PROGRAM

## 2024-08-28 PROCEDURE — 77386 HC INTENSITY-MODULATED RADIATION THERAPY (IMRT), COMPLEX: CPT | Performed by: STUDENT IN AN ORGANIZED HEALTH CARE EDUCATION/TRAINING PROGRAM

## 2024-08-30 NOTE — PROGRESS NOTES
Radiation Oncology Treatment Summary    Patient Name:  Sherin Hancock  MRN:  [id number]  :  1945    Referring Provider: Anurag Pedro MD  Primary Care Provider: Brent Bell MD    Brief History: Sherin Hancock is a 78 y.o. 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    Radiation Treatment Summary:    3D CRT: Right Breast    Treatment Period Technique Fraction Dose Fractions Total Dose   Course 1 2024-2024  (days elapsed: 9)         R_PartBreast 2024-2024 VMAT 600 / 600 cGy 5 / 5 3000 / 3,000 cGy       Please see the patient's Mosaiq chart for further details regarding the radiation plan, including beam energy.    Concurrent Chemotherapy:  none    CTCAE Toxicity Overview:   Toxicity Assessment          2024    15:09   Toxicity Assessment   Adverse Events Reviewed (WDL) No (Exceptions to WDL)   Treatment Site Breast   Dermatitis Radiation Grade 0       skin cream given     Patient Disposition: Patient to return to clinic in 3 months. She was instructed to reach out with any questions or concerns in the meantime.    Thank you for allowing me to participate in this patient's care.    David Isaac MD  Department of Radiation Oncology  [facility]    Portions of this note were generated using voice recognition software, and may be subject to transcription errors.

## 2024-09-30 ENCOUNTER — OFFICE VISIT (OUTPATIENT)
Dept: HEMATOLOGY/ONCOLOGY | Facility: CLINIC | Age: 79
End: 2024-09-30
Payer: MEDICARE

## 2024-09-30 VITALS
SYSTOLIC BLOOD PRESSURE: 134 MMHG | DIASTOLIC BLOOD PRESSURE: 71 MMHG | BODY MASS INDEX: 27.23 KG/M2 | RESPIRATION RATE: 18 BRPM | HEART RATE: 72 BPM | TEMPERATURE: 97.5 F | OXYGEN SATURATION: 97 % | WEIGHT: 150.79 LBS

## 2024-09-30 DIAGNOSIS — D05.11 DUCTAL CARCINOMA IN SITU (DCIS) OF RIGHT BREAST: ICD-10-CM

## 2024-09-30 DIAGNOSIS — N60.91 ATYPICAL DUCTAL HYPERPLASIA OF RIGHT BREAST: ICD-10-CM

## 2024-09-30 PROCEDURE — 99214 OFFICE O/P EST MOD 30 MIN: CPT | Performed by: INTERNAL MEDICINE

## 2024-09-30 PROCEDURE — 1126F AMNT PAIN NOTED NONE PRSNT: CPT | Performed by: INTERNAL MEDICINE

## 2024-09-30 PROCEDURE — 1036F TOBACCO NON-USER: CPT | Performed by: INTERNAL MEDICINE

## 2024-09-30 PROCEDURE — 1159F MED LIST DOCD IN RCRD: CPT | Performed by: INTERNAL MEDICINE

## 2024-09-30 RX ORDER — ANASTROZOLE 1 MG/1
1 TABLET ORAL DAILY
COMMUNITY

## 2024-09-30 ASSESSMENT — PATIENT HEALTH QUESTIONNAIRE - PHQ9
SUM OF ALL RESPONSES TO PHQ9 QUESTIONS 1 AND 2: 0
2. FEELING DOWN, DEPRESSED OR HOPELESS: NOT AT ALL
1. LITTLE INTEREST OR PLEASURE IN DOING THINGS: NOT AT ALL

## 2024-09-30 ASSESSMENT — PAIN SCALES - GENERAL: PAINLEVEL: 0-NO PAIN

## 2024-09-30 NOTE — PROGRESS NOTES
"Patient ID: Sherin Hancock is a 79 y.o. female.  Referring Physician: No referring provider defined for this encounter.  Primary Care Provider: Brent Bell MD   6/28/2024  FINAL DIAGNOSIS   A. Right breast, magseed localized lumpectomy:  -- Ductal carcinoma in situ, see synoptic report and note.  -- Background of complex sclerosing lesion.  -- Previous biopsy site changes     Note: The interpretation and the staging will be based on the current specimen and the previous biopsy ([id number]). Immunostains for p63, SMMHC, CK5/6 and ER were reviewed on A6.     B. Right breast, posterior margin, excision:  -- Negative for carcinoma.  -- Usual ductal hyperplasia.  -- Sclerosing adenosis.  -- Previous biopsy site changes.     : Dr. Momo Chan (part A and selected slides from [id number]).   Electronically signed by Annette Dsouza MD on 7/15/2024 at 1536      [facility]   By the signature on this report, the individual or group listed as making the Final Interpretation/Diagnosis certifies that they have reviewed this case.    Case Summary Report   DCIS OF THE BREAST: Resection   8th Edition - Protocol posted: 3/23/2022DCIS OF THE BREAST: RESECTION - All Specimens  SPECIMEN   Procedure  Excision (less than total mastectomy)   Specimen Laterality  Right   TUMOR   Tumor Site  Not specified   Histologic Type  Ductal carcinoma in situ   Size (Extent) of DCIS  Estimated size (extent) of DCIS is at least (Millimeters): 4.5 mm   Number of Blocks with DCIS  1   Number of Blocks Examined  20   Architectural Patterns  Cribriform     Solid   Nuclear Grade  Grade II (intermediate)   Necrosis  Present, central (expansive \"comedo\" necrosis)   Microcalcifications  Not identified   MARGINS   Margin Status  All margins negative for DCIS   Distance from DCIS to Closest Margin  Greater than: 2 mm   Margin Comment  Margin assessment includes the final posterior margin (specimen B)   REGIONAL LYMPH NODES   Regional Lymph " Node Status  Not applicable (no regional lymph nodes submitted or found)   PATHOLOGIC STAGE CLASSIFICATION (pTNM, AJCC 8th Edition)   Reporting of pT, pN, and (when applicable) pM categories is based on information available to the pathologist at the time the report is issued. As per the AJCC (Chapter 1, 8th Ed.) it is the managing physician’s responsibility to establish the final pathologic stage based upon all pertinent information, including but potentially not limited to this pathology report.   pT Category  pTis (DCIS)   pN Category  pN not assigned (no nodes submitted or found)   Breast Biomarker Testing Performed on Previous Biopsy     Estrogen Receptor (ER) Status  Positive   Percentage of Cells with Nuclear Positivity  >95 %   Testing Performed on Case Number  [id number] A6   .      Block for Additional Biomarkers/Molecular Studies  [facility]   Normal Block: A1  Tumor Block: A6, [id number] A4,A6   Addendum   Surgical/Block Number:[id number] A6  Specimen Site:Right breast  Specimen Type:lumpectomy     Estrogen Receptor (clone SP1):                     Positive                                                                           Percentage with nuclear staining: >95%                                                                          Intensity of staining: Strong     Comment:   Internal positive controls were identified in this specimen.   ASCO/CAP guidelines for ischemic times are met for this sample.   Analysis was performed on Ductal Carcinoma in Situ.     For ER: Ranges for interpretation: Invasive carcinoma cells exhibiting greater than or equal to 10% nuclear staining are considered POSITIVE. Invasive carcinoma cells exhibiting less than 10%, but greater than or equal to 1% are considered LOW POSITIVE. Invasive carcinoma cells exhibiting less than 1% staining are considered NEGATIVE. (Reference: Arch Pathol Lab Med. doi:10.5858/arpa. [id number]) The stated steroid receptor activity for ER was  derived from rabbit monoclonal antibody staining (clone SP1) on formalin fixed, paraffin embedded specimens, unless otherwise noted.  Each assay is performed using appropriate positive and negative internal controls.    Addendum electronically signed by Annette Dsouza MD on 7/15/2024 at 1537   Clinical History  [facility]   Pre-op diagnosis:  Mass of upper outer quadrant of right breast [N63.11]  Status post radiation therapy.  Started Arimidex 1 mg p.o. daily on September 1, 2024.      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  The patient has a history of ulcerative colitis currently in remission not on any medications.  Routine screening mammogram on May 9, 2024 revealed a right breast asymmetry and microcalcifications.  Diagnostic right mammogram revealed persistent architectural distortion in the right breast, biopsy was recommended.  Stereotactic core biopsy of the right breast on May 20, 2024 revealed atypical ductal hyperplasia arising in a complex sclerosing lesion.  The patient underwent right breast lumpectomy on June 28, 2024.  Pathology revealed 4.5 mm intermediate grade DCIS, with comedonecrosis.  All margins negative by greater than 2 mm.  ER positive greater than 95%.  The patient was referred to radiation oncology for further evaluation and management.  The patient is being considered for abbreviated course of radiation therapy in the next 2 to 3 weeks.  Referred for further evaluation and management.    At interview on August 5, 2024 the patient narrated entire history.  Denied history of fevers, night sweats, weight loss, nausea, vomiting, hematemesis, melena, hematochezia and hematuria.  The patient had come for follow-up on September 30, 2024 regarding history of DCIS upper outer quadrant right breast, ER/NV positive, s/p lumpectomy, status post radiation therapy, started Arimidex 1 mg p.o. daily on September 1, 2024.  Tolerating  well.      Past medical history DCIS ER positive upper outer quadrant right breast, hyperlipidemia, hypertension, hypothyroidism, history of sebaceous cyst, history of vaginal discharge, past history of ulcerative colitis now in remission not receiving any medications.    Past surgical history:    Provide breast biopsy May 14, 2024, lumpectomy , cerebral aneurysm repair , tubal ligation 2015.    Family history:    Breast cancer in father sister age of onset 40-49, in her mother sister age 50-59, cancer in her mother endometrial cancer in father's sister.    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  Denies history of smoking alcohol abuse drug abuse.     3 para 3, age 18 first birth.  Menarche at 16, menopause in early 40s brief history of HRT use.  History of remote left breast excision which was presumably benign.  Review of Systems   All other systems reviewed and are negative.       Objective   BSA: 1.74 meters squared  /71   Pulse 72   Temp 36.4 °C (97.5 °F)   Resp 18   Wt 68.4 kg (150 lb 12.7 oz)   SpO2 97%   BMI 27.23 kg/m²     Family History   Problem Relation Name Age of Onset    Hypertension Mother L. Kohout     Cancer Mother L. Kohout     Arthritis Mother L. Kohout     Hypertension Father Lad Kohout     Other (bladder cancer) Father Lad Alon         age 64 upon passing    COPD Father Lad Kohout     Arthritis Sister FRANCOISE Moranriatashi     Breast cancer Mother's Sister ADRYAN Guerline 40 - 49    Breast cancer Father's Sister      Endometrial cancer Father's Sister      Breast cancer Other Father's sister 50 - 59     Oncology History    No history exists.       Sherin Hancock  reports that she has never smoked. She has never used smokeless tobacco.  She  reports no history of alcohol use.  She  reports no history of drug use.    Physical Exam  Constitutional:       Appearance: Normal appearance.   HENT:      Head: Normocephalic and atraumatic.       Nose: Nose normal.      Mouth/Throat:      Mouth: Mucous membranes are moist.      Pharynx: Oropharynx is clear.   Eyes:      Extraocular Movements: Extraocular movements intact.      Conjunctiva/sclera: Conjunctivae normal.      Pupils: Pupils are equal, round, and reactive to light.   Cardiovascular:      Rate and Rhythm: Normal rate and regular rhythm.   Pulmonary:      Effort: Pulmonary effort is normal.      Breath sounds: Normal breath sounds.   Abdominal:      General: Abdomen is flat. Bowel sounds are normal.      Palpations: Abdomen is soft.   Musculoskeletal:         General: Normal range of motion.      Cervical back: Normal range of motion and neck supple.   Neurological:      General: No focal deficit present.      Mental Status: She is alert and oriented to person, place, and time. Mental status is at baseline.   Psychiatric:         Mood and Affect: Mood normal.         Behavior: Behavior normal.         Thought Content: Thought content normal.         Judgment: Judgment normal.       A well-healed scar upper outer quadrant right breast with surgery related changes and possible seroma.  Performance Status:  Asymptomatic    Assessment/Plan    The patient is a 78-year-old woman presenting with new onset microcalcifications on the mammogram on May 9, 2024.  Initially a biopsy followed by lumpectomy revealed DCIS upper outer quadrant right breast ER positive.  Physical examination revealed a well-healed scar upper outer quadrant right breast.  Patient was evaluated by radiation oncology services.  She is being considered for abbreviated course of radiation therapy within the next 2 to 3 weeks.  I had a detailed discussion with the patient explained to her about hormonal manipulation and if agreeable would recommend Arimidex 1 mg p.o. daily to begin after completion of radiation therapy.  Prescription sent.  Printed material from [facility] provided.  The patient understood appreciated all the details  provided and was grateful and agreeable.  Return in 8 weeks.  The patient had come for follow-up on September 30, 2024 regarding history of DCIS upper outer quadrant right breast, ER/TN positive, s/p lumpectomy, status post radiation therapy, started Arimidex 1 mg p.o. daily on September 1, 2024.  Tolerating well.    Thank you for allowing me to participate in care of your patient.  Any questions please feel free to call me.      Diagnoses and all orders for this visit:  Atypical ductal hyperplasia of right breast  -     Referral to Hematology and Oncology  -     anastrozole (Arimidex) 1 mg tablet; Take 1 tablet (1 mg total) by mouth once daily.  Swallow whole with a drink of water.           Low Ragland MD

## 2024-12-03 ENCOUNTER — HOSPITAL ENCOUNTER (OUTPATIENT)
Dept: RADIATION ONCOLOGY | Facility: CLINIC | Age: 79
Setting detail: RADIATION/ONCOLOGY SERIES
Discharge: HOME | End: 2024-12-03
Payer: MEDICARE

## 2024-12-03 VITALS
SYSTOLIC BLOOD PRESSURE: 136 MMHG | TEMPERATURE: 97.7 F | WEIGHT: 152.6 LBS | OXYGEN SATURATION: 98 % | RESPIRATION RATE: 18 BRPM | HEART RATE: 78 BPM | BODY MASS INDEX: 27.55 KG/M2 | DIASTOLIC BLOOD PRESSURE: 83 MMHG

## 2024-12-03 DIAGNOSIS — D05.11 DUCTAL CARCINOMA IN SITU (DCIS) OF RIGHT BREAST: Primary | ICD-10-CM

## 2024-12-03 PROCEDURE — 99213 OFFICE O/P EST LOW 20 MIN: CPT | Performed by: STUDENT IN AN ORGANIZED HEALTH CARE EDUCATION/TRAINING PROGRAM

## 2024-12-03 PROCEDURE — G2211 COMPLEX E/M VISIT ADD ON: HCPCS | Performed by: STUDENT IN AN ORGANIZED HEALTH CARE EDUCATION/TRAINING PROGRAM

## 2024-12-03 ASSESSMENT — ENCOUNTER SYMPTOMS
GASTROINTESTINAL NEGATIVE: 1
ARTHRALGIAS: 1
HEMATOLOGIC/LYMPHATIC NEGATIVE: 1
FATIGUE: 1
ENDOCRINE NEGATIVE: 1
NEUROLOGICAL NEGATIVE: 1
PSYCHIATRIC NEGATIVE: 1
EYES NEGATIVE: 1
RESPIRATORY NEGATIVE: 1
CARDIOVASCULAR NEGATIVE: 1

## 2024-12-03 ASSESSMENT — PATIENT HEALTH QUESTIONNAIRE - PHQ9
1. LITTLE INTEREST OR PLEASURE IN DOING THINGS: NOT AT ALL
SUM OF ALL RESPONSES TO PHQ9 QUESTIONS 1 AND 2: 0
2. FEELING DOWN, DEPRESSED OR HOPELESS: NOT AT ALL

## 2024-12-03 ASSESSMENT — PAIN SCALES - GENERAL: PAINLEVEL_OUTOF10: 0-NO PAIN

## 2024-12-03 NOTE — PROGRESS NOTES
Radiation Oncology Follow-Up    Patient Name:  Sherin Hancock  MRN:  [id number]  :  1945    Primary Care Provider: Brent Bell MD  Care Team: Patient Care Team:  Brent Bell MD as PCP - General  Low Ragland MD as Consulting Physician (Hematology and Oncology)  Anurag Pedro MD as Surgeon (General Surgery)    Date of Service: 12/3/2024     SUBJECTIVE  History of Present Illness:   Sherin Hancock is a 79 y.o. 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   She returns today for follow-up.  She stated she tolerated her radiotherapy course very well without skin irritation or pain.  She feels firmness underneath the surgical scar of her right breast, without pain or swelling.  She is tolerating Arimidex though states her breasts bilaterally feel a little more full since starting this.      Treatment Rendered:   3D CRT: Right Breast    Treatment Period Technique Fraction Dose Fractions Total Dose   Course 1 2024-2024  (days elapsed: 9)         R_PartBreast 2024-2024 VMAT 600 / 600 cGy  3000 / 3,000 cGy       Review of Systems:   Review of Systems - Oncology  - please see nursing note    Performance Status:   The Karnofsky performance scale today is 90, Able to carry on normal activity; minor signs or symptoms of disease (ECOG equivalent 0).      OBJECTIVE  /83   Pulse 78   Temp 36.5 °C (97.7 °F)   Resp 18   Wt 69.2 kg (152 lb 9.6 oz)   SpO2 98%   BMI 27.55 kg/m²    Physical Exam  Vitals reviewed.   Constitutional:       General: She is not in acute distress.  HENT:      Head: Normocephalic and atraumatic.   Pulmonary:      Effort: Pulmonary effort is normal. No respiratory distress.   Chest:      Comments: Right s/p lumpectomy, mild persistent seroma in the  lumpectomy cavity. No palpable masses of the right or left breast.  Musculoskeletal:      Comments: ROM intact at the shoulders   Lymphadenopathy:      Upper Body:      Right upper body: No axillary adenopathy.      Left upper body: No axillary adenopathy.   Skin:     General: Skin is warm and dry.   Psychiatric:         Mood and Affect: Mood normal.         Behavior: Behavior normal.           ASSESSMENT:  Sherin Hancock is a 79 y.o. 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    PLAN:   No evidence of disease on exam today. Recovering from radiation course as expected. Continue to use moisturizer as needed for dry skin. Long term sun exposure precautions provided.   She is reaching out to the breast clinic regarding her posttreatment mammograms.  She will follow-up with me in 6 months, or sooner if needed.    NCCN Guidelines were applicable to guide this patients treatment plan. Effort is required for continued longitudinal patient care.    Thank you for allowing me to participate in this patient's care.    David Isaac MD  Department of Radiation Oncology  [facility]    Portions of this note were generated using voice recognition software, and may be subject to transcription errors.

## 2024-12-03 NOTE — PROGRESS NOTES
Radiation Oncology Nursing Note    Pain: The patient's current pain level was assessed.  They report currently having a pain of 0 out of 10.  They feel their pain is under control without the use of pain medications.    Review of Systems:  Review of Systems   Constitutional:  Positive for fatigue.   HENT:  Negative.     Eyes: Negative.    Respiratory: Negative.     Cardiovascular: Negative.    Gastrointestinal: Negative.    Endocrine: Negative.    Genitourinary: Negative.     Musculoskeletal:  Positive for arthralgias.   Skin: Negative.    Neurological: Negative.    Hematological: Negative.    Psychiatric/Behavioral: Negative.

## 2024-12-10 DIAGNOSIS — D05.11 DUCTAL CARCINOMA IN SITU (DCIS) OF RIGHT BREAST: ICD-10-CM

## 2024-12-10 DIAGNOSIS — R93.89 ABNORMAL FINDINGS ON DIAGNOSTIC IMAGING OF OTHER SPECIFIED BODY STRUCTURES: ICD-10-CM

## 2024-12-30 ENCOUNTER — LAB (OUTPATIENT)
Dept: LAB | Facility: LAB | Age: 79
End: 2024-12-30
Payer: MEDICARE

## 2024-12-30 DIAGNOSIS — E03.9 HYPOTHYROIDISM, UNSPECIFIED: Primary | ICD-10-CM

## 2024-12-30 DIAGNOSIS — I10 ESSENTIAL (PRIMARY) HYPERTENSION: ICD-10-CM

## 2024-12-30 DIAGNOSIS — E78.5 HYPERLIPIDEMIA, UNSPECIFIED: ICD-10-CM

## 2024-12-30 LAB
ANION GAP SERPL CALC-SCNC: 10 MMOL/L (ref 10–20)
BUN SERPL-MCNC: 14 MG/DL (ref 6–23)
CALCIUM SERPL-MCNC: 9.1 MG/DL (ref 8.6–10.3)
CHLORIDE SERPL-SCNC: 106 MMOL/L (ref 98–107)
CHOLEST SERPL-MCNC: 150 MG/DL (ref 0–199)
CHOLESTEROL/HDL RATIO: 2.2
CO2 SERPL-SCNC: 30 MMOL/L (ref 21–32)
CREAT SERPL-MCNC: 0.78 MG/DL (ref 0.5–1.05)
EGFRCR SERPLBLD CKD-EPI 2021: 78 ML/MIN/1.73M*2
GLUCOSE SERPL-MCNC: 88 MG/DL (ref 74–99)
HDLC SERPL-MCNC: 69.2 MG/DL
LDLC SERPL CALC-MCNC: 63 MG/DL
NON HDL CHOLESTEROL: 81 MG/DL (ref 0–149)
POTASSIUM SERPL-SCNC: 4.3 MMOL/L (ref 3.5–5.3)
SODIUM SERPL-SCNC: 142 MMOL/L (ref 136–145)
TRIGL SERPL-MCNC: 87 MG/DL (ref 0–149)
TSH SERPL-ACNC: 0.57 MIU/L (ref 0.44–3.98)
VLDL: 17 MG/DL (ref 0–40)

## 2024-12-30 PROCEDURE — 84443 ASSAY THYROID STIM HORMONE: CPT

## 2024-12-30 PROCEDURE — 80061 LIPID PANEL: CPT

## 2024-12-30 PROCEDURE — 80048 BASIC METABOLIC PNL TOTAL CA: CPT

## 2025-01-13 ENCOUNTER — HOSPITAL ENCOUNTER (OUTPATIENT)
Dept: RADIOLOGY | Facility: HOSPITAL | Age: 80
Discharge: HOME | End: 2025-01-13
Payer: MEDICARE

## 2025-01-13 DIAGNOSIS — I67.1 CEREBRAL ANEURYSM (HHS-HCC): ICD-10-CM

## 2025-01-13 PROCEDURE — 70544 MR ANGIOGRAPHY HEAD W/O DYE: CPT

## 2025-01-13 PROCEDURE — 70544 MR ANGIOGRAPHY HEAD W/O DYE: CPT | Performed by: RADIOLOGY

## 2025-01-14 NOTE — RESULT ENCOUNTER NOTE
I spoke with the patient over the phone and informed the patient of the result.  Recommend MRA with and w/o contrast, post coil protocol in 1 year.

## 2025-01-16 ENCOUNTER — APPOINTMENT (OUTPATIENT)
Dept: NEUROSURGERY | Facility: HOSPITAL | Age: 80
End: 2025-01-16
Payer: MEDICARE

## 2025-02-14 DIAGNOSIS — D05.11 DUCTAL CARCINOMA IN SITU (DCIS) OF RIGHT BREAST: ICD-10-CM

## 2025-02-14 DIAGNOSIS — N60.91 ATYPICAL DUCTAL HYPERPLASIA OF RIGHT BREAST: ICD-10-CM

## 2025-02-17 DIAGNOSIS — D05.11 DUCTAL CARCINOMA IN SITU (DCIS) OF RIGHT BREAST: ICD-10-CM

## 2025-02-17 DIAGNOSIS — N60.91 ATYPICAL DUCTAL HYPERPLASIA OF RIGHT BREAST: Primary | ICD-10-CM

## 2025-02-17 DIAGNOSIS — N60.91 ATYPICAL DUCTAL HYPERPLASIA OF RIGHT BREAST: ICD-10-CM

## 2025-02-17 DIAGNOSIS — D05.11 DUCTAL CARCINOMA IN SITU (DCIS) OF RIGHT BREAST: Primary | ICD-10-CM

## 2025-02-17 RX ORDER — ANASTROZOLE 1 MG/1
1 TABLET ORAL DAILY
Qty: 90 TABLET | Refills: 3 | Status: SHIPPED | OUTPATIENT
Start: 2025-02-17 | End: 2025-02-17 | Stop reason: SDUPTHER

## 2025-02-17 RX ORDER — ANASTROZOLE 1 MG/1
1 TABLET ORAL DAILY
Qty: 90 TABLET | Refills: 3 | Status: SHIPPED | OUTPATIENT
Start: 2025-02-17

## 2025-02-17 RX ORDER — ANASTROZOLE 1 MG/1
1 TABLET ORAL DAILY
Qty: 90 TABLET | Refills: 3 | Status: CANCELLED | OUTPATIENT
Start: 2025-02-17

## 2025-03-06 DIAGNOSIS — I67.1 CEREBRAL ANEURYSM (HHS-HCC): ICD-10-CM

## 2025-03-18 ENCOUNTER — LAB (OUTPATIENT)
Dept: LAB | Facility: HOSPITAL | Age: 80
End: 2025-03-18
Payer: MEDICARE

## 2025-03-18 DIAGNOSIS — N60.91 UNSPECIFIED BENIGN MAMMARY DYSPLASIA OF RIGHT BREAST: Primary | ICD-10-CM

## 2025-03-18 DIAGNOSIS — D05.11 DUCTAL CARCINOMA IN SITU (DCIS) OF RIGHT BREAST: ICD-10-CM

## 2025-03-18 DIAGNOSIS — N60.91 ATYPICAL DUCTAL HYPERPLASIA OF RIGHT BREAST: ICD-10-CM

## 2025-03-18 LAB
ALBUMIN SERPL BCP-MCNC: 4.2 G/DL (ref 3.4–5)
ALP SERPL-CCNC: 74 U/L (ref 33–136)
ALT SERPL W P-5'-P-CCNC: 10 U/L (ref 7–45)
ANION GAP SERPL CALC-SCNC: 15 MMOL/L (ref 10–20)
AST SERPL W P-5'-P-CCNC: 14 U/L (ref 9–39)
BASOPHILS # BLD AUTO: 0.05 X10*3/UL (ref 0–0.1)
BASOPHILS NFR BLD AUTO: 0.7 %
BILIRUB SERPL-MCNC: 1.2 MG/DL (ref 0–1.2)
BUN SERPL-MCNC: 15 MG/DL (ref 6–23)
CALCIUM SERPL-MCNC: 9.5 MG/DL (ref 8.6–10.6)
CHLORIDE SERPL-SCNC: 103 MMOL/L (ref 98–107)
CO2 SERPL-SCNC: 27 MMOL/L (ref 21–32)
CREAT SERPL-MCNC: 0.81 MG/DL (ref 0.5–1.05)
EGFRCR SERPLBLD CKD-EPI 2021: 75 ML/MIN/1.73M*2
EOSINOPHIL # BLD AUTO: 0.14 X10*3/UL (ref 0–0.4)
EOSINOPHIL NFR BLD AUTO: 1.9 %
ERYTHROCYTE [DISTWIDTH] IN BLOOD BY AUTOMATED COUNT: 13.7 % (ref 11.5–14.5)
GLUCOSE SERPL-MCNC: 85 MG/DL (ref 74–99)
HCT VFR BLD AUTO: 45 % (ref 36–46)
HGB BLD-MCNC: 13.7 G/DL (ref 12–16)
IMM GRANULOCYTES # BLD AUTO: 0.02 X10*3/UL (ref 0–0.5)
IMM GRANULOCYTES NFR BLD AUTO: 0.3 % (ref 0–0.9)
LYMPHOCYTES # BLD AUTO: 2.1 X10*3/UL (ref 0.8–3)
LYMPHOCYTES NFR BLD AUTO: 29.1 %
MCH RBC QN AUTO: 29.9 PG (ref 26–34)
MCHC RBC AUTO-ENTMCNC: 30.4 G/DL (ref 32–36)
MCV RBC AUTO: 98 FL (ref 80–100)
MONOCYTES # BLD AUTO: 0.45 X10*3/UL (ref 0.05–0.8)
MONOCYTES NFR BLD AUTO: 6.2 %
NEUTROPHILS # BLD AUTO: 4.46 X10*3/UL (ref 1.6–5.5)
NEUTROPHILS NFR BLD AUTO: 61.8 %
NRBC BLD-RTO: 0 /100 WBCS (ref 0–0)
PLATELET # BLD AUTO: 333 X10*3/UL (ref 150–450)
POTASSIUM SERPL-SCNC: 4.1 MMOL/L (ref 3.5–5.3)
PROT SERPL-MCNC: 6.8 G/DL (ref 6.4–8.2)
RBC # BLD AUTO: 4.58 X10*6/UL (ref 4–5.2)
SODIUM SERPL-SCNC: 141 MMOL/L (ref 136–145)
WBC # BLD AUTO: 7.2 X10*3/UL (ref 4.4–11.3)

## 2025-03-18 PROCEDURE — 80053 COMPREHEN METABOLIC PANEL: CPT

## 2025-03-18 PROCEDURE — 85025 COMPLETE CBC W/AUTO DIFF WBC: CPT

## 2025-03-31 ENCOUNTER — OFFICE VISIT (OUTPATIENT)
Dept: HEMATOLOGY/ONCOLOGY | Facility: CLINIC | Age: 80
End: 2025-03-31
Payer: MEDICARE

## 2025-03-31 VITALS
HEART RATE: 75 BPM | RESPIRATION RATE: 16 BRPM | OXYGEN SATURATION: 97 % | TEMPERATURE: 96.8 F | BODY MASS INDEX: 26.95 KG/M2 | DIASTOLIC BLOOD PRESSURE: 69 MMHG | WEIGHT: 149.25 LBS | SYSTOLIC BLOOD PRESSURE: 124 MMHG

## 2025-03-31 DIAGNOSIS — N60.91 ATYPICAL DUCTAL HYPERPLASIA OF RIGHT BREAST: ICD-10-CM

## 2025-03-31 DIAGNOSIS — D05.11 DUCTAL CARCINOMA IN SITU (DCIS) OF RIGHT BREAST: ICD-10-CM

## 2025-03-31 PROCEDURE — 99214 OFFICE O/P EST MOD 30 MIN: CPT | Performed by: INTERNAL MEDICINE

## 2025-03-31 PROCEDURE — 1126F AMNT PAIN NOTED NONE PRSNT: CPT | Performed by: INTERNAL MEDICINE

## 2025-03-31 PROCEDURE — 1159F MED LIST DOCD IN RCRD: CPT | Performed by: INTERNAL MEDICINE

## 2025-03-31 ASSESSMENT — PAIN SCALES - GENERAL: PAINLEVEL_OUTOF10: 0-NO PAIN

## 2025-03-31 NOTE — PROGRESS NOTES
"Patient ID: Sherin Hancock is a 79 y.o. female.  Referring Physician: No referring provider defined for this encounter.  Primary Care Provider: Brent Bell MD   6/28/2024  FINAL DIAGNOSIS   A. Right breast, magseed localized lumpectomy:  -- Ductal carcinoma in situ, see synoptic report and note.  -- Background of complex sclerosing lesion.  -- Previous biopsy site changes     Note: The interpretation and the staging will be based on the current specimen and the previous biopsy ([id number]). Immunostains for p63, SMMHC, CK5/6 and ER were reviewed on A6.     B. Right breast, posterior margin, excision:  -- Negative for carcinoma.  -- Usual ductal hyperplasia.  -- Sclerosing adenosis.  -- Previous biopsy site changes.     : Dr. Momo Chan (part A and selected slides from [id number]).   Electronically signed by Annette Dsouza MD on 7/15/2024 at 1536      [facility]   By the signature on this report, the individual or group listed as making the Final Interpretation/Diagnosis certifies that they have reviewed this case.    Case Summary Report   DCIS OF THE BREAST: Resection   8th Edition - Protocol posted: 3/23/2022DCIS OF THE BREAST: RESECTION - All Specimens  SPECIMEN   Procedure  Excision (less than total mastectomy)   Specimen Laterality  Right   TUMOR   Tumor Site  Not specified   Histologic Type  Ductal carcinoma in situ   Size (Extent) of DCIS  Estimated size (extent) of DCIS is at least (Millimeters): 4.5 mm   Number of Blocks with DCIS  1   Number of Blocks Examined  20   Architectural Patterns  Cribriform     Solid   Nuclear Grade  Grade II (intermediate)   Necrosis  Present, central (expansive \"comedo\" necrosis)   Microcalcifications  Not identified   MARGINS   Margin Status  All margins negative for DCIS   Distance from DCIS to Closest Margin  Greater than: 2 mm   Margin Comment  Margin assessment includes the final posterior margin (specimen B)   REGIONAL LYMPH NODES   Regional Lymph " Node Status  Not applicable (no regional lymph nodes submitted or found)   PATHOLOGIC STAGE CLASSIFICATION (pTNM, AJCC 8th Edition)   Reporting of pT, pN, and (when applicable) pM categories is based on information available to the pathologist at the time the report is issued. As per the AJCC (Chapter 1, 8th Ed.) it is the managing physician’s responsibility to establish the final pathologic stage based upon all pertinent information, including but potentially not limited to this pathology report.   pT Category  pTis (DCIS)   pN Category  pN not assigned (no nodes submitted or found)   Breast Biomarker Testing Performed on Previous Biopsy     Estrogen Receptor (ER) Status  Positive   Percentage of Cells with Nuclear Positivity  >95 %   Testing Performed on Case Number  [id number] A6   .      Block for Additional Biomarkers/Molecular Studies  [facility]   Normal Block: A1  Tumor Block: A6, [id number] A4,A6   Addendum   Surgical/Block Number:[id number] A6  Specimen Site:Right breast  Specimen Type:lumpectomy     Estrogen Receptor (clone SP1):                     Positive                                                                           Percentage with nuclear staining: >95%                                                                          Intensity of staining: Strong     Comment:   Internal positive controls were identified in this specimen.   ASCO/CAP guidelines for ischemic times are met for this sample.   Analysis was performed on Ductal Carcinoma in Situ.     For ER: Ranges for interpretation: Invasive carcinoma cells exhibiting greater than or equal to 10% nuclear staining are considered POSITIVE. Invasive carcinoma cells exhibiting less than 10%, but greater than or equal to 1% are considered LOW POSITIVE. Invasive carcinoma cells exhibiting less than 1% staining are considered NEGATIVE. (Reference: Arch Pathol Lab Med. doi:10.5858/arpa. [id number]) The stated steroid receptor activity for ER was  derived from rabbit monoclonal antibody staining (clone SP1) on formalin fixed, paraffin embedded specimens, unless otherwise noted.  Each assay is performed using appropriate positive and negative internal controls.    Addendum electronically signed by Annette Dsouza MD on 7/15/2024 at 1537   Clinical History  [facility]   Pre-op diagnosis:  Mass of upper outer quadrant of right breast [N63.11]  Status post radiation therapy.  Started Arimidex 1 mg p.o. daily on September 1, 2024.      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  The patient has a history of ulcerative colitis currently in remission not on any medications.  Routine screening mammogram on May 9, 2024 revealed a right breast asymmetry and microcalcifications.  Diagnostic right mammogram revealed persistent architectural distortion in the right breast, biopsy was recommended.  Stereotactic core biopsy of the right breast on May 20, 2024 revealed atypical ductal hyperplasia arising in a complex sclerosing lesion.  The patient underwent right breast lumpectomy on June 28, 2024.  Pathology revealed 4.5 mm intermediate grade DCIS, with comedonecrosis.  All margins negative by greater than 2 mm.  ER positive greater than 95%.  The patient was referred to radiation oncology for further evaluation and management.  The patient is being considered for abbreviated course of radiation therapy in the next 2 to 3 weeks.  Referred for further evaluation and management.    At interview on August 5, 2024 the patient narrated entire history.  Denied history of fevers, night sweats, weight loss, nausea, vomiting, hematemesis, melena, hematochezia and hematuria.  The patient had come for follow-up on September 30, 2024 regarding history of DCIS upper outer quadrant right breast, ER/MS positive, s/p lumpectomy, status post radiation therapy, started Arimidex 1 mg p.o. daily on September 1, 2024.  Tolerating  well.    The patient had come for follow-up on 3/31/25 regarding history of DCIS upper outer quadrant right breast, ER/IL positive, s/p lumpectomy, status post radiation therapy, started Arimidex 1 mg p.o. daily on 2024.  Tolerating well.      Past medical history DCIS ER positive upper outer quadrant right breast, hyperlipidemia, hypertension, hypothyroidism, history of sebaceous cyst, history of vaginal discharge, past history of ulcerative colitis now in remission not receiving any medications.    Past surgical history:    Provide breast biopsy May 14, 2024, lumpectomy , cerebral aneurysm repair , tubal ligation 2015.    Family history:    Breast cancer in father sister age of onset 40-49, in her mother sister age 50-59, cancer in her mother endometrial cancer in father's sister.    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  Denies history of smoking alcohol abuse drug abuse.     3 para 3, age 18 first birth.  Menarche at 16, menopause in early 40s brief history of HRT use.  History of remote left breast excision which was presumably benign.  Review of Systems   All other systems reviewed and are negative.       Objective   BSA: 1.73 meters squared  /69   Pulse 75   Temp 36 °C (96.8 °F)   Resp 16   Wt 67.7 kg (149 lb 4 oz)   SpO2 97%   BMI 26.95 kg/m²     Family History   Problem Relation Name Age of Onset    Hypertension Mother L. Alon     Cancer Mother L. Alon     Arthritis Mother L. Alon     Hypertension Father Lad Alon     Other (bladder cancer) Father Lad Alon         age 64 upon passing    COPD Father Lad Alon     Arthritis Sister FRANCOISE Leon     Breast cancer Mother's Sister ADRYAN Cunha 40 - 49    Breast cancer Father's Sister      Endometrial cancer Father's Sister      Breast cancer Other Father's sister 50 - 59     Oncology History    No history exists.       Sherin Hancock  reports that she has never  smoked. She has never used smokeless tobacco.  She  reports no history of alcohol use.  She  reports no history of drug use.    Physical Exam  Constitutional:       Appearance: Normal appearance.   HENT:      Head: Normocephalic and atraumatic.      Nose: Nose normal.      Mouth/Throat:      Mouth: Mucous membranes are moist.      Pharynx: Oropharynx is clear.   Eyes:      Extraocular Movements: Extraocular movements intact.      Conjunctiva/sclera: Conjunctivae normal.      Pupils: Pupils are equal, round, and reactive to light.   Cardiovascular:      Rate and Rhythm: Normal rate and regular rhythm.   Pulmonary:      Effort: Pulmonary effort is normal.      Breath sounds: Normal breath sounds.   Abdominal:      General: Abdomen is flat. Bowel sounds are normal.      Palpations: Abdomen is soft.   Musculoskeletal:         General: Normal range of motion.      Cervical back: Normal range of motion and neck supple.   Neurological:      General: No focal deficit present.      Mental Status: She is alert and oriented to person, place, and time. Mental status is at baseline.   Psychiatric:         Mood and Affect: Mood normal.         Behavior: Behavior normal.         Thought Content: Thought content normal.         Judgment: Judgment normal.       A well-healed scar upper outer quadrant right breast with surgery related changes and possible seroma.  Performance Status:  Asymptomatic    Assessment/Plan    The patient is a 78-year-old woman presenting with new onset microcalcifications on the mammogram on May 9, 2024.  Initially a biopsy followed by lumpectomy revealed DCIS upper outer quadrant right breast ER positive.  Physical examination revealed a well-healed scar upper outer quadrant right breast.  Patient was evaluated by radiation oncology services.  She is being considered for abbreviated course of radiation therapy within the next 2 to 3 weeks.  I had a detailed discussion with the patient explained to her  about hormonal manipulation and if agreeable would recommend Arimidex 1 mg p.o. daily to begin after completion of radiation therapy.  Prescription sent.  Printed material from [facility] provided.  The patient understood appreciated all the details provided and was grateful and agreeable.  Return in 8 weeks.  The patient had come for follow-up on September 30, 2024 regarding history of DCIS upper outer quadrant right breast, ER/LA positive, s/p lumpectomy, status post radiation therapy, started Arimidex 1 mg p.o. daily on September 1, 2024.  Tolerating well.    The patient had come for follow-up on 3/31/25 regarding history of DCIS upper outer quadrant right breast, ER/LA positive, s/p lumpectomy, status post radiation therapy, started Arimidex 1 mg p.o. daily on September 1, 2024.  Tolerating well.  Continue Arimidex 1 mg p.o. daily.  Return in 6 months.    Thank you for allowing me to participate in care of your patient.  Any questions please feel free to call me.      Diagnoses and all orders for this visit:  Atypical ductal hyperplasia of right breast  -     Referral to Hematology and Oncology  -     anastrozole (Arimidex) 1 mg tablet; Take 1 tablet (1 mg total) by mouth once daily.  Swallow whole with a drink of water.           Low Ragland MD                          [place]

## 2025-04-15 RX ORDER — ANASTROZOLE 1 MG/1
1 TABLET ORAL DAILY
Qty: 90 TABLET | Refills: 3 | Status: SHIPPED | OUTPATIENT
Start: 2025-04-15

## 2025-05-12 ENCOUNTER — APPOINTMENT (OUTPATIENT)
Dept: RADIOLOGY | Facility: CLINIC | Age: 80
End: 2025-05-12
Payer: MEDICARE

## 2025-05-12 ENCOUNTER — APPOINTMENT (OUTPATIENT)
Dept: SURGERY | Facility: CLINIC | Age: 80
End: 2025-05-12
Payer: MEDICARE

## 2025-05-14 ENCOUNTER — HOSPITAL ENCOUNTER (OUTPATIENT)
Dept: RADIOLOGY | Facility: CLINIC | Age: 80
Discharge: HOME | End: 2025-05-14
Payer: MEDICARE

## 2025-05-14 ENCOUNTER — OFFICE VISIT (OUTPATIENT)
Dept: SURGERY | Facility: CLINIC | Age: 80
End: 2025-05-14
Payer: MEDICARE

## 2025-05-14 VITALS — WEIGHT: 149 LBS | BODY MASS INDEX: 27.42 KG/M2 | HEIGHT: 62 IN

## 2025-05-14 VITALS
BODY MASS INDEX: 26.58 KG/M2 | WEIGHT: 150 LBS | HEART RATE: 72 BPM | HEIGHT: 63 IN | SYSTOLIC BLOOD PRESSURE: 146 MMHG | TEMPERATURE: 98.5 F | RESPIRATION RATE: 18 BRPM | DIASTOLIC BLOOD PRESSURE: 90 MMHG

## 2025-05-14 DIAGNOSIS — Z12.31 SCREENING MAMMOGRAM FOR BREAST CANCER: ICD-10-CM

## 2025-05-14 DIAGNOSIS — R93.89 ABNORMAL FINDINGS ON DIAGNOSTIC IMAGING OF OTHER SPECIFIED BODY STRUCTURES: ICD-10-CM

## 2025-05-14 DIAGNOSIS — D05.11 DUCTAL CARCINOMA IN SITU (DCIS) OF RIGHT BREAST: Primary | ICD-10-CM

## 2025-05-14 DIAGNOSIS — D05.11 DUCTAL CARCINOMA IN SITU (DCIS) OF RIGHT BREAST: ICD-10-CM

## 2025-05-14 PROCEDURE — 99213 OFFICE O/P EST LOW 20 MIN: CPT | Performed by: SURGERY

## 2025-05-14 PROCEDURE — 1160F RVW MEDS BY RX/DR IN RCRD: CPT | Performed by: SURGERY

## 2025-05-14 PROCEDURE — 1159F MED LIST DOCD IN RCRD: CPT | Performed by: SURGERY

## 2025-05-14 PROCEDURE — 77066 DX MAMMO INCL CAD BI: CPT | Performed by: RADIOLOGY

## 2025-05-14 PROCEDURE — G0279 TOMOSYNTHESIS, MAMMO: HCPCS | Performed by: RADIOLOGY

## 2025-05-14 PROCEDURE — 1036F TOBACCO NON-USER: CPT | Performed by: SURGERY

## 2025-05-14 PROCEDURE — 1126F AMNT PAIN NOTED NONE PRSNT: CPT | Performed by: SURGERY

## 2025-05-14 PROCEDURE — 77066 DX MAMMO INCL CAD BI: CPT

## 2025-05-14 SDOH — ECONOMIC STABILITY: FOOD INSECURITY: WITHIN THE PAST 12 MONTHS, YOU WORRIED THAT YOUR FOOD WOULD RUN OUT BEFORE YOU GOT MONEY TO BUY MORE.: NEVER TRUE

## 2025-05-14 SDOH — ECONOMIC STABILITY: FOOD INSECURITY: WITHIN THE PAST 12 MONTHS, THE FOOD YOU BOUGHT JUST DIDN'T LAST AND YOU DIDN'T HAVE MONEY TO GET MORE.: NEVER TRUE

## 2025-05-14 ASSESSMENT — ENCOUNTER SYMPTOMS
LOSS OF SENSATION IN FEET: 0
DEPRESSION: 0
OCCASIONAL FEELINGS OF UNSTEADINESS: 0

## 2025-05-14 ASSESSMENT — COLUMBIA-SUICIDE SEVERITY RATING SCALE - C-SSRS
2. HAVE YOU ACTUALLY HAD ANY THOUGHTS OF KILLING YOURSELF?: NO
6. HAVE YOU EVER DONE ANYTHING, STARTED TO DO ANYTHING, OR PREPARED TO DO ANYTHING TO END YOUR LIFE?: NO
1. IN THE PAST MONTH, HAVE YOU WISHED YOU WERE DEAD OR WISHED YOU COULD GO TO SLEEP AND NOT WAKE UP?: NO

## 2025-05-14 ASSESSMENT — PAIN SCALES - GENERAL: PAINLEVEL_OUTOF10: 0-NO PAIN

## 2025-05-14 ASSESSMENT — LIFESTYLE VARIABLES
HOW MANY STANDARD DRINKS CONTAINING ALCOHOL DO YOU HAVE ON A TYPICAL DAY: PATIENT DOES NOT DRINK
HOW OFTEN DO YOU HAVE SIX OR MORE DRINKS ON ONE OCCASION: NEVER
HOW OFTEN DO YOU HAVE A DRINK CONTAINING ALCOHOL: NEVER
AUDIT-C TOTAL SCORE: 0
SKIP TO QUESTIONS 9-10: 1

## 2025-05-14 NOTE — PATIENT INSTRUCTIONS
Thank you for choosing [facility].  Today's exam as well as your bilateral mammograms are without abnormality.  Please continue monthly self-exam.  Contact the breast clinic for any change in self-exam including but not limited to mass dimpling discharge or any concern.  Follow-up 1 year with imaging

## 2025-05-14 NOTE — PROGRESS NOTES
History Of Present Illness  HPI 1 year follow-up to ductal carcinoma right breast treated with Magseed partial mastectomy followed by adjuvant partial breast radiation therapy.  Patient remains on Arimidex.  Initial stereotactic biopsy was atypical ductal hyperplasia but surgery revealed 4.5 mm focus of ductal carcinoma in situ with negative margins.  Currently denies mass dimpling discharge in either breast     Past Medical History  She has a past medical history of Arthritis (December 2017), Breast cancer (march 2024), Breast mass (Current), Cerebral aneurysm ([facility]) (November 27, 2022), Encounter for screening for malignant neoplasm of vagina, Hyperlipidemia, Hypertension, Hypothyroidism, Personal history of diseases of the skin and subcutaneous tissue, Personal history of irradiation (august 2024), Personal history of other diseases of the female genital tract, Personal history of other medical treatment, Personal history of other medical treatment, and Ulcerative colitis.    Surgical History  She has a past surgical history that includes Breast lumpectomy (1990's); Tubal ligation (07/17/2015); Breast biopsy (May 14, 2024); and Cerebral aneurysm repair (2023).     Social History  She reports that she has never smoked. She has never used smokeless tobacco. She reports that she does not drink alcohol and does not use drugs.    Family History  Family History[1]     Allergies  Sulfasalazine    Review of Systems 10 review of systems otherwise negative     Visit Vitals  /90   Pulse 72   Temp 36.9 °C (98.5 °F)   Resp 18        Physical Exam GENERAL  MENTAL STATUS - Alert. GENERAL APPEARANCE - Cooperative and well groomed. ORIENTATION - Oriented X4. BUILD & NUTRITION - Well nourished and well developed. HYDRATION - Well hydrated.    INTEGUMENTARY  GENERAL CHARACTERISTICS - Overall examination of the patient's skin reveals no rashes. COLOR - not icteric. SKIN MOISTURE - normal skin moisture. TEMPERATURE - normal  "worth is noted.    HEAD AND NECK  HEAD  HEAD SHAPE - Atraumatic and normocephalic.  TRACHEA - midline.  THYROID  GLAND CHARACTERISTICS - normal size and consistency and no palpable nodules.    EYE  SCLERA/CONJUNCTIVA - BILATERAL - Anicteric.    CHEST AND LUNG EXAM  AUSCULTATION -  BREATH SOUNDS - Clear.    BREAST  NIPPLES: CHARACTERISTICS - LEFT - No rash. Not inverted. RIGHT - No rash. Not Inverted. DISCHARGE - LEFT - None. DISCHARGE - RIGHT - None.  BREAST - LEFT - Non tender. No mass, skin changes, biopsy scars, dimpling or dimpling or [place]. RIGHT - Non tender. No mass, skin changes, dimpling or dimpling or [place].  Circumareolar scar without abnormality BILATERAL - Symmetric.    CARDIOVASCULAR  AUSCULTATION: RHYTHM - Regular. HEART SOUNDS - Normal heart sounds.  MURMURS & OTHER HEART SOUNDS - Auscultation of the heart reveals regular rate and no murmurs.    ABDOMEN  PALPATION/PERCUSSION - Palpation and percussion of the abdomen reveal soft, non tender, no mass and no hepatosplenomegaly.    LYMPHATIC  GENERAL LYMPHATICS  BREAST LYMPHATIC EXAM - No cervical adenopathy, supraclavicular adenopathy or axilliary adenopathy.         Last Recorded Vitals  Blood pressure 146/90, pulse 72, temperature 36.9 °C (98.5 °F), resp. rate 18, height 1.588 m (5' 2.5\"), weight 68 kg (150 lb).    Relevant Results      Imaging  BI mammo bilateral diagnostic tomosynthesis  Result Date: 5/14/2025  Status post right lumpectomy since the prior exam.   BI-RADS CATEGORY:   BI-RADS Category:  2 Benign. Recommendation:  Annual Screening. Recommended Date:  1 Year. Laterality:  Bilateral.   For any future breast imaging appointments, please call [phone] ([phone]).     MACRO: None   Signed by: Teofilo Ramirez 5/14/2025 1:26 PM Dictation workstation:   [id number]    I reviewed the diagnostic imaging.  BI-RADS 2.  Cardiology, Vascular, and Other Imaging  No other imaging results found for the past 7 days        " @[facility]@    Assessment/Plan       Clinically and mammographically free of disease and 79-year-old with history of ductal carcinoma.  Continue Arimidex.  Follow-up 1 year       I spent 20 minutes in the professional and overall care of this patient.      Anurag Pedro MD         [1]   Family History  Problem Relation Name Age of Onset    Hypertension Mother L. Alon     Cancer Mother L. Alon     Arthritis Mother LLeticia Chi     Hypertension Father Luis F Chi     Other (bladder cancer) Father Luis F Chi         age 64 upon passing    COPD Father Luis F Chi     Arthritis Sister FRANCOISE Leon     Breast cancer Mother's Sister ADRYAN Cunha 40 - 49    Breast cancer Father's Sister      Endometrial cancer Father's Sister      Breast cancer Other Father's sister 50 - 59

## 2025-06-03 ENCOUNTER — HOSPITAL ENCOUNTER (OUTPATIENT)
Dept: RADIATION ONCOLOGY | Facility: CLINIC | Age: 80
Setting detail: RADIATION/ONCOLOGY SERIES
Discharge: HOME | End: 2025-06-03
Payer: MEDICARE

## 2025-06-03 VITALS
DIASTOLIC BLOOD PRESSURE: 81 MMHG | TEMPERATURE: 97.9 F | WEIGHT: 148.4 LBS | BODY MASS INDEX: 26.71 KG/M2 | RESPIRATION RATE: 18 BRPM | SYSTOLIC BLOOD PRESSURE: 152 MMHG | OXYGEN SATURATION: 98 % | HEART RATE: 71 BPM

## 2025-06-03 DIAGNOSIS — D05.11 DUCTAL CARCINOMA IN SITU (DCIS) OF RIGHT BREAST: Primary | ICD-10-CM

## 2025-06-03 PROCEDURE — 99213 OFFICE O/P EST LOW 20 MIN: CPT | Performed by: STUDENT IN AN ORGANIZED HEALTH CARE EDUCATION/TRAINING PROGRAM

## 2025-06-03 ASSESSMENT — ENCOUNTER SYMPTOMS
CONSTITUTIONAL NEGATIVE: 1
EYES NEGATIVE: 1
PSYCHIATRIC NEGATIVE: 1
NEUROLOGICAL NEGATIVE: 1
ARTHRALGIAS: 1
GASTROINTESTINAL NEGATIVE: 1
ENDOCRINE NEGATIVE: 1
COUGH: 1
HEMATOLOGIC/LYMPHATIC NEGATIVE: 1
CARDIOVASCULAR NEGATIVE: 1

## 2025-06-03 ASSESSMENT — PAIN SCALES - GENERAL: PAINLEVEL_OUTOF10: 0-NO PAIN

## 2025-06-03 NOTE — PROGRESS NOTES
Radiation Oncology Nursing Note    Pain: The patient's current pain level was assessed.  They report currently having a pain of 0 out of 10.  They feel their pain is under control without the use of pain medications.    Review of Systems:  Review of Systems   Constitutional: Negative.    HENT:  Negative.     Eyes: Negative.    Respiratory:  Positive for cough.    Cardiovascular: Negative.    Gastrointestinal: Negative.    Endocrine: Negative.    Genitourinary: Negative.     Musculoskeletal:  Positive for arthralgias.   Skin: Negative.    Neurological: Negative.    Hematological: Negative.    Psychiatric/Behavioral: Negative.

## 2025-06-03 NOTE — PROGRESS NOTES
Radiation Oncology Follow-Up    Patient Name:  Sherin Hancock  MRN:  [id number]  :  1945    Primary Care Provider: Brent Bell MD  Care Team: Patient Care Team:  Brent Bell MD as PCP - General  Low Ragland MD as Consulting Physician (Hematology and Oncology)  Anurag Pedro MD as Surgeon (General Surgery)    Date of Service: 6/3/2025     SUBJECTIVE  History of Present Illness:   Sherin Hancock is a 79 y.o. 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 She is on arimidex. Most recent bilateral mammograms on 2025 were BI-RADS 2 benign.   She endorses occasional soreness in the breasts bilaterally.  She can also palpate a firm nodule in the region of her prior lumpectomy bed.  Denies range of motion limitations at the shoulders.     Treatment Rendered:   3D CRT: Right Breast (Resolved)    Treatment Period Technique Fraction Dose Fractions Total Dose   Course 1 2024-2024  (days elapsed: 9)         R_PartBreast 2024-2024 VMAT 600 / 600 cGy 5 / 5 3,000 / 3,000 cGy       Review of Systems:   Review of Systems - Oncology  - please see nursing note    Performance Status:   The Karnofsky performance scale today is 80, Normal activity with effort; some signs or symptoms of disease (ECOG equivalent 1).      OBJECTIVE  /81   Pulse 71   Temp 36.6 °C (97.9 °F)   Resp 18   Wt 67.3 kg (148 lb 6.4 oz)   SpO2 98%   BMI 26.71 kg/m²    Physical Exam  Vitals reviewed.   Constitutional:       General: She is not in acute distress.  HENT:      Head: Normocephalic and atraumatic.   Pulmonary:      Effort: Pulmonary effort is normal. No respiratory distress.   Chest:      Comments: Right s/p lumepctomy,   Well-healed scar.  Palpable induration in the lumpectomy bed consistent  with posttreatment fibrosis.  No palpable mass of the right or left breast.  Abdominal:      General: There is no distension.   Lymphadenopathy:      Upper Body:      Right upper body: No axillary adenopathy.      Left upper body: No axillary adenopathy.   Skin:     Findings: No erythema.   Neurological:      Mental Status: She is alert and oriented to person, place, and time.   Psychiatric:         Mood and Affect: Mood normal.         Behavior: Behavior normal.           ASSESSMENT:  Sherin Hancock is a 79 y.o. 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    PLAN:   No evidence of recurrence on exam and recent mammograms.  She is recovering appropriately from her radiotherapy course.  She is scheduled for her next annual mammogram in May 2026, and has continued follow-ups with her medical oncology and surgical teams.  She will follow-up in our clinic as needed, and is encouraged to reach out at any time with questions regarding her prior treatments or with any new concerns.    NCCN Guidelines were applicable to guide this patients treatment plan.    Thank you for allowing me to participate in this patient's care.    David Isaac MD  Department of Radiation Oncology  [facility]    Portions of this note were generated using voice recognition software, and may be subject to transcription errors.

## (undated) DEVICE — PROBE COVER, INTRAOPERATIVE, 13 X 244CM (5 X 96IN)

## (undated) DEVICE — FORCEP, GRASPING, 155ML, TRIANGLE TIP, DISP

## (undated) DEVICE — SOLUTION, IRRIGATION, STERILE WATER, 1000 ML, POUR BOTTLE

## (undated) DEVICE — RETRACTOR, HANDHELD, 250ML, DBL ENDED

## (undated) DEVICE — DRESSING, TELFA, 3X4

## (undated) DEVICE — BRA, SURGICAL, SMILE, MEDIUM, 36-38 IN

## (undated) DEVICE — DRESSING, TRANSPARENT, TEGADERM, 2-3/8 X 2-3/4 IN

## (undated) DEVICE — DRAPE, SHEET, THREE QUARTER, FAN FOLD, 57 X 77 IN

## (undated) DEVICE — Device

## (undated) DEVICE — DRESSING, GAUZE, SUPER, KERLIX, 7.75 X 8.75 IN, BULK, NS

## (undated) DEVICE — SLEEVE, VASO PRESS, CALF GARMENT, MEDIUM, GREEN

## (undated) DEVICE — KIT, MARGINMARKER, 6 INK COLORS, STANDARD